# Patient Record
Sex: MALE | Race: BLACK OR AFRICAN AMERICAN | NOT HISPANIC OR LATINO | Employment: FULL TIME | RURAL
[De-identification: names, ages, dates, MRNs, and addresses within clinical notes are randomized per-mention and may not be internally consistent; named-entity substitution may affect disease eponyms.]

---

## 2021-05-02 VITALS — HEIGHT: 69 IN | BODY MASS INDEX: 46.65 KG/M2 | WEIGHT: 315 LBS

## 2021-05-06 ENCOUNTER — HOSPITAL ENCOUNTER (EMERGENCY)
Facility: HOSPITAL | Age: 31
Discharge: HOME | End: 2021-05-06
Attending: FAMILY MEDICINE
Payer: COMMERCIAL

## 2021-05-06 VITALS
OXYGEN SATURATION: 99 % | RESPIRATION RATE: 18 BRPM | WEIGHT: 300 LBS | DIASTOLIC BLOOD PRESSURE: 89 MMHG | TEMPERATURE: 99 F | HEIGHT: 68 IN | BODY MASS INDEX: 45.47 KG/M2 | HEART RATE: 96 BPM | SYSTOLIC BLOOD PRESSURE: 127 MMHG

## 2021-05-06 DIAGNOSIS — L73.2 HIDRADENITIS SUPPURATIVA: Primary | ICD-10-CM

## 2021-05-06 PROCEDURE — 99283 PR EMERGENCY DEPT VISIT,LEVEL III: ICD-10-PCS | Mod: ,,, | Performed by: FAMILY MEDICINE

## 2021-05-06 PROCEDURE — 99283 EMERGENCY DEPT VISIT LOW MDM: CPT

## 2021-05-06 PROCEDURE — 99283 EMERGENCY DEPT VISIT LOW MDM: CPT | Mod: ,,, | Performed by: FAMILY MEDICINE

## 2021-05-06 RX ORDER — CLINDAMYCIN HYDROCHLORIDE 300 MG/1
300 CAPSULE ORAL EVERY 6 HOURS
Qty: 3 CAPSULE | Refills: 0 | Status: SHIPPED | OUTPATIENT
Start: 2021-05-06 | End: 2021-09-23

## 2021-05-19 ENCOUNTER — OFFICE VISIT (OUTPATIENT)
Dept: INTERNAL MEDICINE | Facility: CLINIC | Age: 31
End: 2021-05-19
Payer: COMMERCIAL

## 2021-05-19 VITALS
OXYGEN SATURATION: 100 % | RESPIRATION RATE: 16 BRPM | DIASTOLIC BLOOD PRESSURE: 100 MMHG | SYSTOLIC BLOOD PRESSURE: 160 MMHG | WEIGHT: 315 LBS | HEART RATE: 88 BPM | BODY MASS INDEX: 47.74 KG/M2 | HEIGHT: 68 IN

## 2021-05-19 DIAGNOSIS — I10 ELEVATED BLOOD PRESSURE READING IN OFFICE WITH DIAGNOSIS OF HYPERTENSION: ICD-10-CM

## 2021-05-19 DIAGNOSIS — L03.115 CELLULITIS AND ABSCESS OF RIGHT LOWER EXTREMITY: ICD-10-CM

## 2021-05-19 DIAGNOSIS — L02.415 CELLULITIS AND ABSCESS OF RIGHT LOWER EXTREMITY: ICD-10-CM

## 2021-05-19 DIAGNOSIS — L73.2 HIDRADENITIS SUPPURATIVA: ICD-10-CM

## 2021-05-19 DIAGNOSIS — F17.200 TOBACCO DEPENDENCE: ICD-10-CM

## 2021-05-19 DIAGNOSIS — Z76.89 ENCOUNTER TO ESTABLISH CARE WITH NEW DOCTOR: Primary | ICD-10-CM

## 2021-05-19 PROCEDURE — 99214 PR OFFICE/OUTPT VISIT, EST, LEVL IV, 30-39 MIN: ICD-10-PCS | Mod: S$PBB,,, | Performed by: INTERNAL MEDICINE

## 2021-05-19 PROCEDURE — 99214 OFFICE O/P EST MOD 30 MIN: CPT | Mod: PBBFAC | Performed by: INTERNAL MEDICINE

## 2021-05-19 PROCEDURE — 99214 OFFICE O/P EST MOD 30 MIN: CPT | Mod: S$PBB,,, | Performed by: INTERNAL MEDICINE

## 2021-05-19 RX ORDER — CLINDAMYCIN HYDROCHLORIDE 300 MG/1
300 CAPSULE ORAL EVERY 6 HOURS
Qty: 56 CAPSULE | Refills: 0 | Status: SHIPPED | OUTPATIENT
Start: 2021-05-19 | End: 2021-06-02

## 2021-05-19 RX ORDER — HYDROCODONE BITARTRATE AND ACETAMINOPHEN 7.5; 325 MG/1; MG/1
1 TABLET ORAL EVERY 6 HOURS PRN
Qty: 20 TABLET | Refills: 0 | Status: SHIPPED | OUTPATIENT
Start: 2021-05-19 | End: 2023-01-25

## 2021-05-24 ENCOUNTER — HOSPITAL ENCOUNTER (OUTPATIENT)
Dept: RADIOLOGY | Facility: HOSPITAL | Age: 31
Discharge: HOME | End: 2021-05-24
Attending: INTERNAL MEDICINE
Payer: COMMERCIAL

## 2021-05-24 DIAGNOSIS — L02.415 CELLULITIS AND ABSCESS OF RIGHT LOWER EXTREMITY: ICD-10-CM

## 2021-05-24 DIAGNOSIS — L03.115 CELLULITIS AND ABSCESS OF RIGHT LOWER EXTREMITY: ICD-10-CM

## 2021-05-24 PROCEDURE — 93926 US LOWER EXTREMITY ARTERIES RIGHT: ICD-10-PCS | Mod: 26,RT,,

## 2021-05-24 PROCEDURE — 93926 LOWER EXTREMITY STUDY: CPT | Mod: 26,RT,,

## 2021-05-24 PROCEDURE — 93926 LOWER EXTREMITY STUDY: CPT | Mod: TC,RT

## 2021-06-07 ENCOUNTER — OFFICE VISIT (OUTPATIENT)
Dept: SURGERY | Facility: CLINIC | Age: 31
End: 2021-06-07
Payer: COMMERCIAL

## 2021-06-07 ENCOUNTER — OFFICE VISIT (OUTPATIENT)
Dept: FAMILY MEDICINE | Facility: CLINIC | Age: 31
End: 2021-06-07
Payer: COMMERCIAL

## 2021-06-07 VITALS
DIASTOLIC BLOOD PRESSURE: 92 MMHG | OXYGEN SATURATION: 100 % | HEART RATE: 72 BPM | WEIGHT: 315 LBS | BODY MASS INDEX: 47.74 KG/M2 | SYSTOLIC BLOOD PRESSURE: 150 MMHG | TEMPERATURE: 99 F | RESPIRATION RATE: 18 BRPM | HEIGHT: 68 IN

## 2021-06-07 VITALS — WEIGHT: 315 LBS | HEIGHT: 68 IN | BODY MASS INDEX: 47.74 KG/M2

## 2021-06-07 DIAGNOSIS — Z11.3 SCREENING EXAMINATION FOR VENEREAL DISEASE: Primary | ICD-10-CM

## 2021-06-07 DIAGNOSIS — L73.2 HIDRADENITIS SUPPURATIVA: ICD-10-CM

## 2021-06-07 LAB
HIV 1+O+2 AB SERPL QL: NORMAL
SYPHILIS AB INTERPRETATION: NORMAL

## 2021-06-07 PROCEDURE — 99202 OFFICE O/P NEW SF 15 MIN: CPT | Mod: ,,, | Performed by: NURSE PRACTITIONER

## 2021-06-07 PROCEDURE — 86703 HIV-1/HIV-2 1 RESULT ANTBDY: CPT | Mod: ,,, | Performed by: CLINICAL MEDICAL LABORATORY

## 2021-06-07 PROCEDURE — 87591 CHLAMYDIA/GONORRHOEAE(GC), PCR: ICD-10-PCS | Mod: ,,, | Performed by: CLINICAL MEDICAL LABORATORY

## 2021-06-07 PROCEDURE — 99204 PR OFFICE/OUTPT VISIT, NEW, LEVL IV, 45-59 MIN: ICD-10-PCS | Mod: S$PBB,,, | Performed by: STUDENT IN AN ORGANIZED HEALTH CARE EDUCATION/TRAINING PROGRAM

## 2021-06-07 PROCEDURE — 86694 HERPES SIMPLEX NES ANTBDY: CPT | Mod: ,,, | Performed by: CLINICAL MEDICAL LABORATORY

## 2021-06-07 PROCEDURE — 87591 N.GONORRHOEAE DNA AMP PROB: CPT | Mod: ,,, | Performed by: CLINICAL MEDICAL LABORATORY

## 2021-06-07 PROCEDURE — 86695 HERPES SIMPLEX 1 & 2 IGG: ICD-10-PCS | Mod: ,,, | Performed by: CLINICAL MEDICAL LABORATORY

## 2021-06-07 PROCEDURE — 99213 OFFICE O/P EST LOW 20 MIN: CPT | Mod: PBBFAC | Performed by: STUDENT IN AN ORGANIZED HEALTH CARE EDUCATION/TRAINING PROGRAM

## 2021-06-07 PROCEDURE — 87491 CHLMYD TRACH DNA AMP PROBE: CPT | Mod: ,,, | Performed by: CLINICAL MEDICAL LABORATORY

## 2021-06-07 PROCEDURE — 99204 OFFICE O/P NEW MOD 45 MIN: CPT | Mod: S$PBB,,, | Performed by: STUDENT IN AN ORGANIZED HEALTH CARE EDUCATION/TRAINING PROGRAM

## 2021-06-07 PROCEDURE — 99202 PR OFFICE/OUTPT VISIT, NEW, LEVL II, 15-29 MIN: ICD-10-PCS | Mod: ,,, | Performed by: NURSE PRACTITIONER

## 2021-06-07 PROCEDURE — 86696 HERPES SIMPLEX 1 & 2 IGG: ICD-10-PCS | Mod: ,,, | Performed by: CLINICAL MEDICAL LABORATORY

## 2021-06-07 PROCEDURE — 87491 CHLAMYDIA/GONORRHOEAE(GC), PCR: ICD-10-PCS | Mod: ,,, | Performed by: CLINICAL MEDICAL LABORATORY

## 2021-06-07 PROCEDURE — 86780 TREPONEMA PALLIDUM: CPT | Mod: ,,, | Performed by: CLINICAL MEDICAL LABORATORY

## 2021-06-07 PROCEDURE — 86694 HERPES SIMPLEX 1 & 2 IGM: ICD-10-PCS | Mod: ,,, | Performed by: CLINICAL MEDICAL LABORATORY

## 2021-06-07 PROCEDURE — 86780 TREPONEMA PALLIDUM (SYPHILIS) ANTIBODY: ICD-10-PCS | Mod: ,,, | Performed by: CLINICAL MEDICAL LABORATORY

## 2021-06-07 PROCEDURE — 86696 HERPES SIMPLEX TYPE 2 TEST: CPT | Mod: ,,, | Performed by: CLINICAL MEDICAL LABORATORY

## 2021-06-07 PROCEDURE — 86695 HERPES SIMPLEX TYPE 1 TEST: CPT | Mod: ,,, | Performed by: CLINICAL MEDICAL LABORATORY

## 2021-06-07 PROCEDURE — 86703 HIV 1 / 2 ANTIBODY: ICD-10-PCS | Mod: ,,, | Performed by: CLINICAL MEDICAL LABORATORY

## 2021-06-08 ENCOUNTER — TELEPHONE (OUTPATIENT)
Dept: FAMILY MEDICINE | Facility: CLINIC | Age: 31
End: 2021-06-08

## 2021-06-08 LAB
CHLAMYDIA BY PCR: NEGATIVE
HSV IGM SER QL IA: NEGATIVE
HSV TYPE 1 AB IGG INDEX: >7.83
HSV TYPE 2 AB IGG INDEX: 0.07
HSV1 IGG SER QL: POSITIVE
HSV2 IGG SER QL: NEGATIVE
N. GONORRHOEAE (GC) BY PCR: NEGATIVE

## 2021-06-09 DIAGNOSIS — L73.2 HIDRADENITIS SUPPURATIVA: Primary | ICD-10-CM

## 2021-09-23 ENCOUNTER — OFFICE VISIT (OUTPATIENT)
Dept: FAMILY MEDICINE | Facility: CLINIC | Age: 31
End: 2021-09-23
Payer: COMMERCIAL

## 2021-09-23 VITALS
RESPIRATION RATE: 20 BRPM | HEIGHT: 69 IN | BODY MASS INDEX: 46.65 KG/M2 | TEMPERATURE: 97 F | OXYGEN SATURATION: 98 % | HEART RATE: 86 BPM | WEIGHT: 315 LBS

## 2021-09-23 DIAGNOSIS — Z20.822 EXPOSURE TO COVID-19 VIRUS: Primary | ICD-10-CM

## 2021-09-23 DIAGNOSIS — J01.00 ACUTE NON-RECURRENT MAXILLARY SINUSITIS: ICD-10-CM

## 2021-09-23 DIAGNOSIS — Z20.822 LAB TEST NEGATIVE FOR COVID-19 VIRUS: ICD-10-CM

## 2021-09-23 LAB
CTP QC/QA: YES
SARS-COV-2 AG RESP QL IA.RAPID: NEGATIVE

## 2021-09-23 PROCEDURE — 99213 OFFICE O/P EST LOW 20 MIN: CPT | Mod: ,,, | Performed by: NURSE PRACTITIONER

## 2021-09-23 PROCEDURE — 99213 PR OFFICE/OUTPT VISIT, EST, LEVL III, 20-29 MIN: ICD-10-PCS | Mod: ,,, | Performed by: NURSE PRACTITIONER

## 2021-09-23 PROCEDURE — 87426 SARSCOV CORONAVIRUS AG IA: CPT | Mod: QW,,, | Performed by: NURSE PRACTITIONER

## 2021-09-23 PROCEDURE — 87426 SARS CORONAVIRUS 2 ANTIGEN POCT: ICD-10-PCS | Mod: QW,,, | Performed by: NURSE PRACTITIONER

## 2021-09-23 RX ORDER — AMOXICILLIN AND CLAVULANATE POTASSIUM 875; 125 MG/1; MG/1
1 TABLET, FILM COATED ORAL 2 TIMES DAILY
Qty: 20 TABLET | Refills: 0 | Status: SHIPPED | OUTPATIENT
Start: 2021-09-23 | End: 2021-10-03

## 2021-12-27 PROBLEM — J01.00 ACUTE NON-RECURRENT MAXILLARY SINUSITIS: Status: RESOLVED | Noted: 2021-09-23 | Resolved: 2021-12-27

## 2022-02-03 ENCOUNTER — OFFICE VISIT (OUTPATIENT)
Dept: FAMILY MEDICINE | Facility: CLINIC | Age: 32
End: 2022-02-03

## 2022-02-03 VITALS
OXYGEN SATURATION: 98 % | RESPIRATION RATE: 20 BRPM | BODY MASS INDEX: 46.98 KG/M2 | HEART RATE: 94 BPM | SYSTOLIC BLOOD PRESSURE: 143 MMHG | DIASTOLIC BLOOD PRESSURE: 93 MMHG | TEMPERATURE: 98 F | HEIGHT: 68 IN | WEIGHT: 310 LBS

## 2022-02-03 DIAGNOSIS — Z11.3 SCREENING EXAMINATION FOR VENEREAL DISEASE: ICD-10-CM

## 2022-02-03 DIAGNOSIS — J01.00 ACUTE NON-RECURRENT MAXILLARY SINUSITIS: ICD-10-CM

## 2022-02-03 DIAGNOSIS — Z20.828 CONTACT WITH OR EXPOSURE TO VIRAL DISEASE: Primary | ICD-10-CM

## 2022-02-03 LAB
CTP QC/QA: YES
SARS-COV-2 AG RESP QL IA.RAPID: NEGATIVE

## 2022-02-03 PROCEDURE — 87491 CHLAMYDIA/GONORRHOEAE(GC), PCR: ICD-10-PCS | Mod: ,,, | Performed by: CLINICAL MEDICAL LABORATORY

## 2022-02-03 PROCEDURE — 87389 HIV-1 AG W/HIV-1&-2 AB AG IA: CPT | Mod: ,,, | Performed by: CLINICAL MEDICAL LABORATORY

## 2022-02-03 PROCEDURE — 87591 CHLAMYDIA/GONORRHOEAE(GC), PCR: ICD-10-PCS | Mod: ,,, | Performed by: CLINICAL MEDICAL LABORATORY

## 2022-02-03 PROCEDURE — 87491 CHLMYD TRACH DNA AMP PROBE: CPT | Mod: ,,, | Performed by: CLINICAL MEDICAL LABORATORY

## 2022-02-03 PROCEDURE — 87591 N.GONORRHOEAE DNA AMP PROB: CPT | Mod: ,,, | Performed by: CLINICAL MEDICAL LABORATORY

## 2022-02-03 PROCEDURE — 86780 TREPONEMA PALLIDUM (SYPHILIS) ANTIBODY: ICD-10-PCS | Mod: ,,, | Performed by: CLINICAL MEDICAL LABORATORY

## 2022-02-03 PROCEDURE — 86695 HERPES SIMPLEX TYPE 1 TEST: CPT | Mod: ,,, | Performed by: CLINICAL MEDICAL LABORATORY

## 2022-02-03 PROCEDURE — 86694 HERPES SIMPLEX 1 & 2 IGM: ICD-10-PCS | Mod: ,,, | Performed by: CLINICAL MEDICAL LABORATORY

## 2022-02-03 PROCEDURE — 86780 TREPONEMA PALLIDUM: CPT | Mod: ,,, | Performed by: CLINICAL MEDICAL LABORATORY

## 2022-02-03 PROCEDURE — 86696 HERPES SIMPLEX 1 & 2 IGG: ICD-10-PCS | Mod: ,,, | Performed by: CLINICAL MEDICAL LABORATORY

## 2022-02-03 PROCEDURE — 86696 HERPES SIMPLEX TYPE 2 TEST: CPT | Mod: ,,, | Performed by: CLINICAL MEDICAL LABORATORY

## 2022-02-03 PROCEDURE — 87426 SARS CORONAVIRUS 2 ANTIGEN POCT: ICD-10-PCS | Mod: QW,,, | Performed by: NURSE PRACTITIONER

## 2022-02-03 PROCEDURE — 87389 HIV 1 / 2 ANTIBODY: ICD-10-PCS | Mod: ,,, | Performed by: CLINICAL MEDICAL LABORATORY

## 2022-02-03 PROCEDURE — 86694 HERPES SIMPLEX NES ANTBDY: CPT | Mod: ,,, | Performed by: CLINICAL MEDICAL LABORATORY

## 2022-02-03 PROCEDURE — 99213 PR OFFICE/OUTPT VISIT, EST, LEVL III, 20-29 MIN: ICD-10-PCS | Mod: ,,, | Performed by: NURSE PRACTITIONER

## 2022-02-03 PROCEDURE — 86695 HERPES SIMPLEX 1 & 2 IGG: ICD-10-PCS | Mod: ,,, | Performed by: CLINICAL MEDICAL LABORATORY

## 2022-02-03 PROCEDURE — 87426 SARSCOV CORONAVIRUS AG IA: CPT | Mod: QW,,, | Performed by: NURSE PRACTITIONER

## 2022-02-03 PROCEDURE — 99213 OFFICE O/P EST LOW 20 MIN: CPT | Mod: ,,, | Performed by: NURSE PRACTITIONER

## 2022-02-03 RX ORDER — CEFDINIR 300 MG/1
300 CAPSULE ORAL 2 TIMES DAILY
Qty: 20 CAPSULE | Refills: 0 | Status: SHIPPED | OUTPATIENT
Start: 2022-02-03 | End: 2022-02-13

## 2022-02-03 RX ORDER — LORATADINE 10 MG/1
10 TABLET ORAL DAILY
Qty: 30 TABLET | Refills: 0 | Status: SHIPPED | OUTPATIENT
Start: 2022-02-03 | End: 2023-05-15

## 2022-02-03 NOTE — PROGRESS NOTES
Subjective:       Patient ID: Clayton Capone is a 31 y.o. male.    Chief Complaint: Headache and Nasal Congestion (bodyaches)    Headache, nasal congestion and body aches  Covid concerns  Wants STD testing- no symptoms    Review of Systems   Constitutional: Negative for appetite change, chills, fatigue and fever.   HENT: Positive for nasal congestion. Negative for ear pain and sore throat.    Eyes: Negative for pain, discharge and itching.   Respiratory: Negative for cough and shortness of breath.    Cardiovascular: Negative for chest pain and leg swelling.   Gastrointestinal: Negative for abdominal pain, change in bowel habit, nausea, vomiting and change in bowel habit.   Genitourinary: Negative for dysuria, flank pain, frequency, genital sores and urgency.   Musculoskeletal: Negative for back pain, gait problem and neck pain.        Body aches   Integumentary:  Negative for rash and wound.   Allergic/Immunologic: Negative for immunocompromised state.   Neurological: Positive for headaches. Negative for dizziness and weakness.   All other systems reviewed and are negative.        Objective:      Physical Exam  Vitals and nursing note reviewed.   Constitutional:       General: He is not in acute distress.     Appearance: Normal appearance. He is not ill-appearing, toxic-appearing or diaphoretic.   HENT:      Head: Normocephalic.      Right Ear: Tympanic membrane, ear canal and external ear normal.      Left Ear: Tympanic membrane, ear canal and external ear normal.      Nose: Congestion and rhinorrhea present.      Mouth/Throat:      Mouth: Mucous membranes are moist.      Pharynx: Posterior oropharyngeal erythema present. No oropharyngeal exudate.   Eyes:      General: No scleral icterus.        Right eye: No discharge.         Left eye: No discharge.      Extraocular Movements: Extraocular movements intact.      Conjunctiva/sclera: Conjunctivae normal.      Pupils: Pupils are equal, round, and reactive to light.    Cardiovascular:      Rate and Rhythm: Normal rate and regular rhythm.      Pulses: Normal pulses.      Heart sounds: Normal heart sounds. No murmur heard.      Pulmonary:      Effort: Pulmonary effort is normal. No respiratory distress.      Breath sounds: Normal breath sounds. No wheezing, rhonchi or rales.   Musculoskeletal:         General: Normal range of motion.      Cervical back: Neck supple. No tenderness.   Lymphadenopathy:      Cervical: No cervical adenopathy.   Skin:     General: Skin is warm and dry.      Capillary Refill: Capillary refill takes less than 2 seconds.      Findings: No rash.   Neurological:      Mental Status: He is alert and oriented to person, place, and time.   Psychiatric:         Mood and Affect: Mood normal.         Behavior: Behavior normal.         Thought Content: Thought content normal.         Judgment: Judgment normal.            Assessment:       1. Contact with or exposure to viral disease    2. Acute non-recurrent maxillary sinusitis    3. Screening examination for venereal disease        Plan:   Contact with or exposure to viral disease  -     SARS Coronavirus 2 Antigen, POCT    Acute non-recurrent maxillary sinusitis  -     cefdinir (OMNICEF) 300 MG capsule; Take 1 capsule (300 mg total) by mouth 2 (two) times daily. for 10 days  Dispense: 20 capsule; Refill: 0  -     loratadine (CLARITIN) 10 mg tablet; Take 1 tablet (10 mg total) by mouth once daily. for 30 doses  Dispense: 30 tablet; Refill: 0    Screening examination for venereal disease  -     Chlamydia/GC, PCR; Future; Expected date: 02/03/2022  -     HIV 1/2 Ag/Ab (4th Gen); Future; Expected date: 02/03/2022  -     Herpes simplex type 1&2 IgG,Herpes titer; Future; Expected date: 02/03/2022  -     Herpes simplex type 1 & 2 IgM,Herpes IgM; Future; Expected date: 02/03/2022  -     Syphilis Antibody with reflex to RPR; Future; Expected date: 02/03/2022

## 2022-02-06 LAB
CHLAMYDIA BY PCR: NEGATIVE
N. GONORRHOEAE (GC) BY PCR: NEGATIVE

## 2022-02-07 LAB
HIV 1+O+2 AB SERPL QL: NORMAL
SYPHILIS AB INTERPRETATION: NORMAL

## 2022-02-08 LAB
HSV TYPE 1 AB IGG INDEX: >11.54
HSV TYPE 2 AB IGG INDEX: 0.5
HSV1 IGG SER QL: POSITIVE
HSV2 IGG SER QL: NEGATIVE

## 2022-02-09 LAB — HSV IGM SER QL IA: NEGATIVE

## 2022-02-10 ENCOUNTER — TELEPHONE (OUTPATIENT)
Dept: FAMILY MEDICINE | Facility: CLINIC | Age: 32
End: 2022-02-10

## 2022-02-10 NOTE — TELEPHONE ENCOUNTER
Identify patient by name and . Informed patient labs was ready for reviewed. Saw labs on [facility]. ----- Message from MILAN Singer sent at 2022  7:32 AM CST -----  Please notify Pt of results

## 2022-05-09 PROBLEM — J01.00 ACUTE NON-RECURRENT MAXILLARY SINUSITIS: Status: RESOLVED | Noted: 2021-09-23 | Resolved: 2022-05-09

## 2022-08-10 ENCOUNTER — HOSPITAL ENCOUNTER (EMERGENCY)
Facility: HOSPITAL | Age: 32
Discharge: HOME | End: 2022-08-10
Attending: EMERGENCY MEDICINE

## 2022-08-10 VITALS
HEART RATE: 56 BPM | BODY MASS INDEX: 47.74 KG/M2 | SYSTOLIC BLOOD PRESSURE: 131 MMHG | OXYGEN SATURATION: 98 % | WEIGHT: 315 LBS | HEIGHT: 68 IN | TEMPERATURE: 99 F | DIASTOLIC BLOOD PRESSURE: 87 MMHG | RESPIRATION RATE: 18 BRPM

## 2022-08-10 DIAGNOSIS — L02.91 ABSCESS: Primary | ICD-10-CM

## 2022-08-10 PROCEDURE — 99282 EMERGENCY DEPT VISIT SF MDM: CPT | Mod: 25,,, | Performed by: EMERGENCY MEDICINE

## 2022-08-10 PROCEDURE — 10060 I&D ABSCESS SIMPLE/SINGLE: CPT

## 2022-08-10 PROCEDURE — 10060 PR DRAIN SKIN ABSCESS SIMPLE: ICD-10-PCS | Mod: ,,, | Performed by: EMERGENCY MEDICINE

## 2022-08-10 PROCEDURE — 10060 I&D ABSCESS SIMPLE/SINGLE: CPT | Mod: ,,, | Performed by: EMERGENCY MEDICINE

## 2022-08-10 PROCEDURE — 99282 PR EMERGENCY DEPT VISIT,LEVEL II: ICD-10-PCS | Mod: 25,,, | Performed by: EMERGENCY MEDICINE

## 2022-08-10 PROCEDURE — 25000003 PHARM REV CODE 250: Performed by: EMERGENCY MEDICINE

## 2022-08-10 PROCEDURE — 99282 EMERGENCY DEPT VISIT SF MDM: CPT | Mod: 25

## 2022-08-10 RX ORDER — LIDOCAINE HYDROCHLORIDE 10 MG/ML
20 INJECTION, SOLUTION EPIDURAL; INFILTRATION; INTRACAUDAL; PERINEURAL
Status: COMPLETED | OUTPATIENT
Start: 2022-08-10 | End: 2022-08-10

## 2022-08-10 RX ORDER — CLINDAMYCIN HYDROCHLORIDE 300 MG/1
300 CAPSULE ORAL
COMMUNITY
Start: 2022-07-26 | End: 2022-10-24

## 2022-08-10 RX ADMIN — LIDOCAINE HYDROCHLORIDE 200 MG: 10 INJECTION, SOLUTION EPIDURAL; INFILTRATION; INTRACAUDAL; PERINEURAL at 08:08

## 2022-08-10 NOTE — Clinical Note
"Clayton "Terrellpaula Capone was seen and treated in our emergency department on 8/10/2022.  He may return to work on 08/13/2022.       If you have any questions or concerns, please don't hesitate to call.      KAIN Parra RN    "

## 2022-08-10 NOTE — ED PROVIDER NOTES
Encounter Date: 8/10/2022       History     Chief Complaint   Patient presents with    Abscess     32 y/o male with a history of hidradenitis superativa presents with right axillary swelling and tenderness that has worsened over the past several days.  Pain is moderate to severe.  Pain is worse with palpation and movement.          Review of patient's allergies indicates:  No Known Allergies  Past Medical History:   Diagnosis Date    Cellulitis     Cyst on buttocks    Edema     Herpes simplex viral infection     Hidradenitis suppurativa     High risk sexual behavior     Hypertension     Parageusia      Past Surgical History:   Procedure Laterality Date    TONSILLECTOMY       Family History   Problem Relation Age of Onset    Cancer Other     Diabetes Other     Hypertension Other      Social History     Tobacco Use    Smoking status: Current Every Day Smoker     Packs/day: 0.50     Years: 5.00     Pack years: 2.50     Types: Cigarettes, Vaping w/o nicotine    Smokeless tobacco: Never Used   Substance Use Topics    Alcohol use: Yes     Comment: occ spirits    Drug use: Yes     Types: Marijuana     Review of Systems   All other systems reviewed and are negative.      Physical Exam     Initial Vitals [08/10/22 0747]   BP Pulse Resp Temp SpO2   131/87 (!) 56 18 98.7 °F (37.1 °C) 98 %      MAP       --         Physical Exam    Nursing note and vitals reviewed.  Constitutional: He appears well-developed and well-nourished.   HENT:   Head: Normocephalic and atraumatic.   Nose: Nose normal.   Mouth/Throat: Oropharynx is clear and moist.   Eyes: Conjunctivae and EOM are normal. Pupils are equal, round, and reactive to light.   Neck: Neck supple.   Normal range of motion.  Cardiovascular: Normal rate, regular rhythm and normal heart sounds.   Pulmonary/Chest: Breath sounds normal.   Abdominal: Abdomen is soft. Bowel sounds are normal.   Musculoskeletal:      Cervical back: Normal range of motion and neck supple.  "     Comments: Several areas of superficial drainage right axilla and a discrete larger fluctuant lesion that is about 1 x 2 cm.  Tender to palpation.       Neurological: He is alert and oriented to person, place, and time. He has normal strength. GCS score is 15. GCS eye subscore is 4. GCS verbal subscore is 5. GCS motor subscore is 6.   Skin: Skin is warm and dry. Capillary refill takes less than 2 seconds.   Psychiatric: He has a normal mood and affect.         Medical Screening Exam   See Full Note    ED Course   I & D - Incision and Drainage    Date/Time: 8/10/2022 8:48 AM  Location procedure was performed: [facility]  Performed by: Frank Bautista MD  Authorized by: Frank Bautista MD   Assisting provider: Frank Bautista MD  Pre-operative diagnosis: abscess  Post-operative diagnosis: abscess  Consent Done: Yes  Consent: Verbal consent obtained. Written consent obtained.  Risks and benefits: risks, benefits and alternatives were discussed  Consent given by: patient  Patient understanding: patient states understanding of the procedure being performed  Patient identity confirmed:  and name  Time out: Immediately prior to procedure a "time out" was called to verify the correct patient, procedure, equipment, support staff and site/side marked as required.  Type: abscess  Body area: trunk  Anesthesia: local infiltration    Anesthesia:  Local Anesthetic: lidocaine 1% without epinephrine  Anesthetic total: 10 mL    Patient sedated: no  Description of findings: purulent drainage   Scalpel size: 15  Incision type: single straight  Complexity: simple  Drainage: pus  Drainage amount: moderate  Wound treatment: incision,  expression of material and  drain placed  Packing material: 1/4 in gauze  Complications: No  Estimated blood loss (mL): 0  Specimens: No  Implants: No  Patient tolerance: Patient tolerated the procedure well with no immediate complications        Labs Reviewed - No data to " display       Imaging Results    None          Medications   LIDOcaine (PF) 10 mg/ml (1%) injection 200 mg (200 mg Intradermal Given 8/10/22 0859)                       Clinical Impression:   Final diagnoses:  [L02.91] Abscess (Primary)          ED Disposition Condition    Discharge Stable        ED Prescriptions     None        Follow-up Information     Follow up With Specialties Details Why Contact Info    Mike Dodson, DO Critical Care Medicine  As needed [address]  [place] [address]  [phone]             Frank Bautista MD  08/10/22 0973

## 2022-10-26 ENCOUNTER — HOSPITAL ENCOUNTER (EMERGENCY)
Facility: HOSPITAL | Age: 32
Discharge: HOME | End: 2022-10-26
Attending: EMERGENCY MEDICINE

## 2022-10-26 VITALS
DIASTOLIC BLOOD PRESSURE: 85 MMHG | SYSTOLIC BLOOD PRESSURE: 151 MMHG | TEMPERATURE: 98 F | RESPIRATION RATE: 18 BRPM | BODY MASS INDEX: 47.74 KG/M2 | OXYGEN SATURATION: 97 % | WEIGHT: 315 LBS | HEIGHT: 68 IN | HEART RATE: 71 BPM

## 2022-10-26 DIAGNOSIS — L73.2 SUPPURATIVE HIDRADENITIS: Primary | ICD-10-CM

## 2022-10-26 PROCEDURE — 99281 EMR DPT VST MAYX REQ PHY/QHP: CPT

## 2022-10-26 PROCEDURE — 99283 EMERGENCY DEPT VISIT LOW MDM: CPT | Mod: ,,, | Performed by: EMERGENCY MEDICINE

## 2022-10-26 PROCEDURE — 99283 PR EMERGENCY DEPT VISIT,LEVEL III: ICD-10-PCS | Mod: ,,, | Performed by: EMERGENCY MEDICINE

## 2022-10-26 RX ORDER — RIFAMPIN 300 MG/1
300 CAPSULE ORAL 2 TIMES DAILY
COMMUNITY
Start: 2022-08-04

## 2022-10-26 NOTE — Clinical Note
"Clayton "Frankie Capone was seen and treated in our emergency department on 10/26/2022.  He may return to work on 10/31/2022.       If you have any questions or concerns, please don't hesitate to call.      Frank Bautista MD"

## 2022-10-27 NOTE — DISCHARGE INSTRUCTIONS
CONTINUE YOUR CURRENT ANTIBIOTICS.  DRINK PLENTY OF FLUIDS.  APPLY WARM COMPRESSES OR SOAK IN WARM WATER OR SOAK UNDER WARM SHOWER SEVERAL TIMES DAILY.  FOLLOW UP AS PLANNED.  RETURN IF SYMPTOMS PERSIST OR WORSEN OR OTHERWISE AS NEEDED.

## 2022-10-27 NOTE — ED PROVIDER NOTES
Encounter Date: 10/26/2022    SCRIBE #1 NOTE: I, Alecia Nam, am scribing for, and in the presence of,  Frank Bautista MD. I have scribed the entire note.     History     Chief Complaint   Patient presents with    Abscess     The patient is a 31 y.o. male that presents to the emergency department due to Hidradenitis suppurativa on both his axillae. The patient explains that he was diagnosed with Hidradenitis suppurativa and he is scheduled to see a surgeon in [place] in early December. The patient states today he has been experiencing drainage in both arms and that it is hard to hold both arms up. No other medical hx or symptoms were reported.     The history is provided by the patient. No  was used.   Review of patient's allergies indicates:  No Known Allergies  Past Medical History:   Diagnosis Date    Cellulitis     Cyst on buttocks    Edema     Herpes simplex viral infection     Hidradenitis suppurativa     High risk sexual behavior     Hypertension     Parageusia     Suppurative hidradenitis      Past Surgical History:   Procedure Laterality Date    TONSILLECTOMY       Family History   Problem Relation Age of Onset    Cancer Other     Diabetes Other     Hypertension Other      Social History     Tobacco Use    Smoking status: Former     Packs/day: 0.50     Years: 5.00     Pack years: 2.50     Types: Cigarettes, Vaping w/o nicotine    Smokeless tobacco: Never   Substance Use Topics    Alcohol use: Not Currently     Comment: occ spirits    Drug use: Yes     Types: Marijuana     Comment: 3/4xs daily     Review of Systems   Constitutional: Negative.    HENT: Negative.     Eyes: Negative.    Respiratory: Negative.     Cardiovascular: Negative.    Gastrointestinal: Negative.    Endocrine: Negative.    Genitourinary: Negative.    Musculoskeletal:         Hidradenitis suppurativa    Skin:  Positive for rash and wound.   Allergic/Immunologic: Negative.    Neurological: Negative.    Hematological:  Negative.    Psychiatric/Behavioral: Negative.     All other systems reviewed and are negative.    Physical Exam     Initial Vitals [10/26/22 2119]   BP Pulse Resp Temp SpO2   (!) 151/85 71 18 98.2 °F (36.8 °C) 97 %      MAP       --         Physical Exam    Constitutional: He appears well-developed and well-nourished.   HENT:   Head: Normocephalic and atraumatic.   Right Ear: External ear normal.   Left Ear: External ear normal.   Nose: Nose normal.   Mouth/Throat: Oropharynx is clear and moist.   Eyes: Conjunctivae and EOM are normal. Pupils are equal, round, and reactive to light.   Neck: Neck supple.   Normal range of motion.  Pulmonary/Chest: Breath sounds normal.   Abdominal: Abdomen is soft. Bowel sounds are normal.   Musculoskeletal:         General: Normal range of motion.      Cervical back: Normal range of motion and neck supple.      Comments: Hidradenitis suppurativa  on both armpits      Neurological: He is alert and oriented to person, place, and time. He has normal strength and normal reflexes.   Skin: Skin is warm and dry.   Psychiatric: He has a normal mood and affect. His behavior is normal. Judgment and thought content normal.       ED Course   Procedures  Labs Reviewed - No data to display       Imaging Results    None          Medications - No data to display             Attending Attestation:           Physician Attestation for Scribe:  Physician Attestation Statement for Scribe #1: I, Frank Bautista MD, reviewed documentation, as scribed by Alecia Nam in my presence, and it is both accurate and complete.                        Clinical Impression:   Final diagnoses:  [L73.2] Suppurative hidradenitis (Primary)      ED Disposition Condition    Discharge Stable          ED Prescriptions    None       Follow-up Information       Follow up With Specialties Details Why Contact Info    Mike Dodson, DO Critical Care Medicine  As needed [address]  [place] [address]  [phone]                Frank Bautista MD  10/26/22 7476

## 2022-10-27 NOTE — ED TRIAGE NOTES
Pt has history of HS (Hidradenitis Suppurative)  and has abcesses all of body says the 2 under left armpit bothering him the most. He also has 2 under right armpit and left inner thigh.

## 2022-12-12 ENCOUNTER — HOSPITAL ENCOUNTER (EMERGENCY)
Facility: HOSPITAL | Age: 32
Discharge: HOME | End: 2022-12-12

## 2022-12-12 VITALS
HEART RATE: 83 BPM | RESPIRATION RATE: 18 BRPM | SYSTOLIC BLOOD PRESSURE: 137 MMHG | BODY MASS INDEX: 47.74 KG/M2 | TEMPERATURE: 98 F | WEIGHT: 315 LBS | OXYGEN SATURATION: 98 % | DIASTOLIC BLOOD PRESSURE: 79 MMHG | HEIGHT: 68 IN

## 2022-12-12 DIAGNOSIS — J06.9 UPPER RESPIRATORY TRACT INFECTION, UNSPECIFIED TYPE: Primary | ICD-10-CM

## 2022-12-12 PROCEDURE — 99283 PR EMERGENCY DEPT VISIT,LEVEL III: ICD-10-PCS | Mod: ,,, | Performed by: NURSE PRACTITIONER

## 2022-12-12 PROCEDURE — 99283 EMERGENCY DEPT VISIT LOW MDM: CPT | Mod: ,,, | Performed by: NURSE PRACTITIONER

## 2022-12-12 PROCEDURE — 99284 EMERGENCY DEPT VISIT MOD MDM: CPT

## 2022-12-12 RX ORDER — PROMETHAZINE HYDROCHLORIDE AND DEXTROMETHORPHAN HYDROBROMIDE 6.25; 15 MG/5ML; MG/5ML
5 SYRUP ORAL EVERY 4 HOURS PRN
Qty: 240 ML | Refills: 0 | Status: SHIPPED | OUTPATIENT
Start: 2022-12-12 | End: 2022-12-22

## 2022-12-12 RX ORDER — METHYLPREDNISOLONE 4 MG/1
TABLET ORAL
Qty: 21 EACH | Refills: 0 | Status: SHIPPED | OUTPATIENT
Start: 2022-12-12 | End: 2023-01-02

## 2022-12-12 RX ORDER — AZITHROMYCIN 250 MG/1
TABLET, FILM COATED ORAL
Qty: 6 TABLET | Refills: 0 | Status: SHIPPED | OUTPATIENT
Start: 2022-12-12 | End: 2023-01-25

## 2022-12-12 NOTE — Clinical Note
"Zarinaewillam "Frankie Capone was seen and treated in our emergency department on 12/12/2022.  He may return to work on 12/14/2022.       If you have any questions or concerns, please don't hesitate to call.      Christina Mays, AMYP"

## 2022-12-13 NOTE — ED PROVIDER NOTES
Encounter Date: 12/12/2022       History     Chief Complaint   Patient presents with    Nasal Congestion    Headache    Sore Throat     Pt presents with sinus symptoms x 3 days.    Review of patient's allergies indicates:  No Known Allergies  Past Medical History:   Diagnosis Date    Cellulitis     Cyst on buttocks    Edema     Herpes simplex viral infection     Hidradenitis suppurativa     High risk sexual behavior     Hypertension     Parageusia     Suppurative hidradenitis      Past Surgical History:   Procedure Laterality Date    TONSILLECTOMY       Family History   Problem Relation Age of Onset    Cancer Other     Diabetes Other     Hypertension Other      Social History     Tobacco Use    Smoking status: Former     Packs/day: 0.50     Years: 5.00     Pack years: 2.50     Types: Cigarettes, Vaping w/o nicotine    Smokeless tobacco: Never   Substance Use Topics    Alcohol use: Not Currently     Comment: occ spirits    Drug use: Yes     Types: Marijuana     Comment: 3/4xs daily     Review of Systems   Constitutional:  Negative for fever.   HENT:  Positive for postnasal drip and sinus pressure. Negative for sore throat.    Respiratory:  Positive for cough. Negative for shortness of breath.    Cardiovascular:  Negative for chest pain.   Gastrointestinal:  Negative for nausea.   Genitourinary:  Negative for dysuria.   Musculoskeletal:  Negative for back pain.   Skin:  Negative for rash.   Neurological:  Negative for dizziness and weakness.   Hematological:  Does not bruise/bleed easily.   Psychiatric/Behavioral:  Negative for behavioral problems.      Physical Exam     Initial Vitals [12/12/22 1820]   BP Pulse Resp Temp SpO2   137/79 83 18 97.6 °F (36.4 °C) 98 %      MAP       --         Physical Exam    Nursing note and vitals reviewed.  Constitutional: He appears well-developed.   HENT:   Head: Normocephalic.   Mouth/Throat: No oropharyngeal exudate.   Eyes: Conjunctivae are normal. Pupils are equal, round, and  reactive to light.   Neck:   Normal range of motion.  Cardiovascular:  Normal rate and regular rhythm.           Pulmonary/Chest: Breath sounds normal.   Musculoskeletal:         General: Normal range of motion.      Cervical back: Normal range of motion.     Neurological: He is alert and oriented to person, place, and time.   Psychiatric: He has a normal mood and affect. Thought content normal.       Medical Screening Exam   See Full Note    ED Course   Procedures  Labs Reviewed - No data to display       Imaging Results    None          Medications - No data to display                    Clinical Impression:   Final diagnoses:  [J06.9] Upper respiratory tract infection, unspecified type (Primary)        ED Disposition Condition    Discharge Stable          ED Prescriptions       Medication Sig Dispense Start Date End Date Auth. Provider    azithromycin (Z-SHE) 250 MG tablet 2 tablets on day 1 and 1 tablet on days 2-5 6 tablet 12/12/2022 -- MILAN De La Torre    promethazine-dextromethorphan (PROMETHAZINE-DM) 6.25-15 mg/5 mL Syrp Take 5 mLs by mouth every 4 (four) hours as needed (cough). Do not take while driving 240 mL 12/12/2022 12/22/2022 MILAN De La Torre    methylPREDNISolone (MEDROL DOSEPACK) 4 mg tablet use as directed 21 each 12/12/2022 1/2/2023 MILAN De La Torre          Follow-up Information    None          MILAN De La Torre  12/12/22 5348

## 2023-01-25 ENCOUNTER — OFFICE VISIT (OUTPATIENT)
Dept: FAMILY MEDICINE | Facility: CLINIC | Age: 33
End: 2023-01-25

## 2023-01-25 VITALS
TEMPERATURE: 99 F | BODY MASS INDEX: 47.74 KG/M2 | RESPIRATION RATE: 18 BRPM | HEIGHT: 68 IN | HEART RATE: 68 BPM | OXYGEN SATURATION: 98 % | WEIGHT: 315 LBS

## 2023-01-25 DIAGNOSIS — Z20.828 CONTACT WITH AND (SUSPECTED) EXPOSURE TO OTHER VIRAL COMMUNICABLE DISEASES: Primary | ICD-10-CM

## 2023-01-25 DIAGNOSIS — B34.9 VIRAL ILLNESS: ICD-10-CM

## 2023-01-25 LAB
CTP QC/QA: YES
FLUAV AG NPH QL: NEGATIVE
FLUBV AG NPH QL: NEGATIVE
SARS-COV-2 AG RESP QL IA.RAPID: NEGATIVE

## 2023-01-25 PROCEDURE — 87428 POCT SARS-COV2 (COVID) WITH FLU ANTIGEN: ICD-10-PCS | Mod: QW,,, | Performed by: NURSE PRACTITIONER

## 2023-01-25 PROCEDURE — 99212 PR OFFICE/OUTPT VISIT, EST, LEVL II, 10-19 MIN: ICD-10-PCS | Mod: ,,, | Performed by: NURSE PRACTITIONER

## 2023-01-25 PROCEDURE — 87428 SARSCOV & INF VIR A&B AG IA: CPT | Mod: QW,,, | Performed by: NURSE PRACTITIONER

## 2023-01-25 PROCEDURE — 99212 OFFICE O/P EST SF 10 MIN: CPT | Mod: ,,, | Performed by: NURSE PRACTITIONER

## 2023-01-25 RX ORDER — CHLOPHEDIANOL HCL AND PYRILAMINE MALEATE 12.5; 12.5 MG/5ML; MG/5ML
10 SOLUTION ORAL 3 TIMES DAILY
Qty: 240 ML | Refills: 0 | Status: SHIPPED | OUTPATIENT
Start: 2023-01-25 | End: 2023-01-30

## 2023-01-25 NOTE — PROGRESS NOTES
Subjective:       Patient ID: Clayton Capone is a 32 y.o. male.    Chief Complaint: COVID-19 Concerns (Cough and runny nose. Exposed by family member. Fever 101 at the highest. )    COVID-19 Concerns (Cough and runny nose. Exposed by family member. Fever 101 at the highest. )    Review of Systems   Constitutional:  Positive for fever. Negative for appetite change and fatigue.   HENT:  Positive for nasal congestion and rhinorrhea. Negative for ear pain and sore throat.    Eyes:  Negative for pain, discharge and itching.   Respiratory:  Positive for cough. Negative for shortness of breath.    Cardiovascular:  Negative for chest pain and leg swelling.   Gastrointestinal:  Negative for abdominal pain, change in bowel habit, nausea, vomiting and change in bowel habit.   Musculoskeletal:  Negative for back pain, gait problem and neck pain.   Integumentary:  Negative for rash and wound.   Neurological:  Negative for dizziness, weakness and headaches.   All other systems reviewed and are negative.      Objective:      Physical Exam  Vitals and nursing note reviewed.   Constitutional:       General: He is not in acute distress.     Appearance: Normal appearance. He is not ill-appearing, toxic-appearing or diaphoretic.   HENT:      Head: Normocephalic.      Right Ear: Tympanic membrane, ear canal and external ear normal.      Left Ear: Tympanic membrane, ear canal and external ear normal.      Nose: Mucosal edema, congestion and rhinorrhea (clear) present.      Right Turbinates: Swollen.      Left Turbinates: Swollen.      Mouth/Throat:      Mouth: Mucous membranes are moist.      Pharynx: No oropharyngeal exudate or posterior oropharyngeal erythema.   Eyes:      General: No scleral icterus.        Right eye: No discharge.         Left eye: No discharge.      Extraocular Movements: Extraocular movements intact.      Conjunctiva/sclera: Conjunctivae normal.      Pupils: Pupils are equal, round, and reactive to light.    Cardiovascular:      Rate and Rhythm: Normal rate and regular rhythm.      Pulses: Normal pulses.      Heart sounds: Normal heart sounds. No murmur heard.  Pulmonary:      Effort: Pulmonary effort is normal. No respiratory distress.      Breath sounds: Normal breath sounds. No wheezing, rhonchi or rales.   Musculoskeletal:         General: Normal range of motion.      Cervical back: Neck supple. No tenderness.   Lymphadenopathy:      Cervical: No cervical adenopathy.   Skin:     General: Skin is warm and dry.      Capillary Refill: Capillary refill takes less than 2 seconds.      Findings: No rash.   Neurological:      Mental Status: He is alert and oriented to person, place, and time.   Psychiatric:         Mood and Affect: Mood normal.         Behavior: Behavior normal.         Thought Content: Thought content normal.         Judgment: Judgment normal.       Office Visit on 01/25/2023   Component Date Value Ref Range Status    SARS Coronavirus 2 Antigen 01/25/2023 Negative  Negative Final    Rapid Influenza A Ag 01/25/2023 Negative  Negative Final    Rapid Influenza B Ag 01/25/2023 Negative  Negative Final     Acceptable 01/25/2023 Yes   Final      Assessment:       1. Contact with and (suspected) exposure to other viral communicable diseases    2. Viral illness          Plan:   Contact with and (suspected) exposure to other viral communicable diseases  -     POCT SARS-COV2 (COVID) with Flu Antigen    Viral illness  -     pyrilamine-chlophedianoL (NINJACOF) 12.5-12.5 mg/5 mL Liqd; Take 10 mLs by mouth 3 (three) times daily. for 5 days  Dispense: 240 mL; Refill: 0         Risks, benefits, and side effects were discussed with the patient. All questions were answered to the fullest satisfaction of the patient, and pt verbalized understanding and agreement to treatment plan. Pt was to call with any new or worsening symptoms, or present to the ER

## 2023-01-25 NOTE — LETTER
January 25, 2023      [facility]  [address] [facility] [place] [address]  Phone: [phone]  Fax: [phone]       Patient: Clayton Capone   YOB: 1990  Date of Visit: 01/25/2023    To Whom It May Concern:    Adela Capone  was at [facility] on 01/25/2023. The patient may return to work/school on 01/27/2023 without restrictions. If you have any questions or concerns, or if I can be of further assistance, please do not hesitate to contact me.    Sincerely,    MILAN Singer

## 2023-05-15 ENCOUNTER — HOSPITAL ENCOUNTER (EMERGENCY)
Facility: HOSPITAL | Age: 33
Discharge: HOME | End: 2023-05-15
Attending: EMERGENCY MEDICINE

## 2023-05-15 VITALS
WEIGHT: 315 LBS | HEART RATE: 81 BPM | BODY MASS INDEX: 47.74 KG/M2 | SYSTOLIC BLOOD PRESSURE: 144 MMHG | HEIGHT: 68 IN | TEMPERATURE: 98 F | OXYGEN SATURATION: 97 % | RESPIRATION RATE: 20 BRPM | DIASTOLIC BLOOD PRESSURE: 82 MMHG

## 2023-05-15 DIAGNOSIS — S63.635A SPRAIN OF INTERPHALANGEAL JOINT OF LEFT RING FINGER, INITIAL ENCOUNTER: Primary | ICD-10-CM

## 2023-05-15 PROCEDURE — 96372 THER/PROPH/DIAG INJ SC/IM: CPT | Performed by: EMERGENCY MEDICINE

## 2023-05-15 PROCEDURE — 63600175 PHARM REV CODE 636 W HCPCS: Performed by: EMERGENCY MEDICINE

## 2023-05-15 PROCEDURE — 99283 PR EMERGENCY DEPT VISIT,LEVEL III: ICD-10-PCS | Mod: ,,, | Performed by: EMERGENCY MEDICINE

## 2023-05-15 PROCEDURE — 99283 EMERGENCY DEPT VISIT LOW MDM: CPT | Mod: ,,, | Performed by: EMERGENCY MEDICINE

## 2023-05-15 PROCEDURE — 99284 EMERGENCY DEPT VISIT MOD MDM: CPT

## 2023-05-15 RX ORDER — KETOROLAC TROMETHAMINE 30 MG/ML
60 INJECTION, SOLUTION INTRAMUSCULAR; INTRAVENOUS
Status: COMPLETED | OUTPATIENT
Start: 2023-05-15 | End: 2023-05-15

## 2023-05-15 RX ADMIN — KETOROLAC TROMETHAMINE 60 MG: 30 INJECTION, SOLUTION INTRAMUSCULAR at 08:05

## 2023-05-15 NOTE — ED TRIAGE NOTES
Presents to ED for complaints of Left Ring Finger possibly sprained or jammed for 2 weeks.  Denies injury at present.

## 2023-05-15 NOTE — ED PROVIDER NOTES
Encounter Date: 5/15/2023       History     Chief Complaint   Patient presents with    Hand Pain     Patient complains of pain to the left 4th finger over the past 2 weeks.  Incomplete relief with ibuprofen.  He has worn a finger splint some at home which he obtained on his own accord.  No known injury.  Patient does a twisting often action at work but is unsure whether that caused the pain.  Minimal swelling.  Worse with movement    Review of patient's allergies indicates:  No Known Allergies  Past Medical History:   Diagnosis Date    Cellulitis     Cyst on buttocks    Edema     Herpes simplex viral infection     Hidradenitis suppurativa     High risk sexual behavior     Hypertension     Parageusia     Suppurative hidradenitis      Past Surgical History:   Procedure Laterality Date    TONSILLECTOMY       Family History   Problem Relation Age of Onset    Cancer Other     Diabetes Other     Hypertension Other      Social History     Tobacco Use    Smoking status: Every Day     Packs/day: 0.50     Years: 5.00     Pack years: 2.50     Types: Cigarettes, Vaping w/o nicotine    Smokeless tobacco: Never   Substance Use Topics    Alcohol use: Not Currently     Comment: occ spirits    Drug use: Yes     Types: Marijuana     Comment: 3/4xs daily     Review of Systems   Constitutional:  Negative for fever.   HENT:  Negative for sore throat.    Respiratory:  Negative for shortness of breath.    Cardiovascular:  Negative for chest pain.   Gastrointestinal:  Negative for nausea.   Genitourinary:  Negative for dysuria.   Musculoskeletal:  Negative for back pain.   Skin:  Negative for rash.   Neurological:  Negative for weakness.   Hematological:  Does not bruise/bleed easily.     Physical Exam     Initial Vitals [05/15/23 0819]   BP Pulse Resp Temp SpO2   (!) 144/82 81 20 98.3 °F (36.8 °C) 97 %      MAP       --         Physical Exam    Nursing note and vitals reviewed.  Constitutional: He appears well-developed and  well-nourished.   HENT:   Head: Normocephalic and atraumatic.   Eyes: EOM are normal. Pupils are equal, round, and reactive to light.   Neck: Neck supple. No thyromegaly present. No JVD present.   Normal range of motion.  Cardiovascular:  Normal rate, regular rhythm, normal heart sounds and intact distal pulses.           No murmur heard.  Pulmonary/Chest: Breath sounds normal. No stridor. No respiratory distress. He has no wheezes.   Abdominal: Abdomen is soft. Bowel sounds are normal. He exhibits no distension. There is no abdominal tenderness.   Musculoskeletal:         General: Tenderness present. No edema. Normal range of motion.      Cervical back: Normal range of motion and neck supple.      Comments: Mild tenderness proximal interphalangeal joint left 4th finger.  No flexor tenderness.  Minimal swelling.  Near complete full range of motion.     Lymphadenopathy:     He has no cervical adenopathy.   Neurological: He is alert and oriented to person, place, and time. He has normal strength. No cranial nerve deficit or sensory deficit. GCS score is 15. GCS eye subscore is 4. GCS verbal subscore is 5. GCS motor subscore is 6.   Skin: Skin is warm and dry. Capillary refill takes less than 2 seconds. No rash noted.   Psychiatric: He has a normal mood and affect.       Medical Screening Exam   See Full Note    ED Course   Procedures  Labs Reviewed - No data to display       Imaging Results              X-Ray Finger 2 or More Views Left (Final result)  Result time 05/15/23 08:44:01      Final result by Ji Quintero II, MD (05/15/23 08:44:01)                   Impression:      Soft tissue swelling.  No other significant findings.      Electronically signed by: Ji Quintero  Date:    05/15/2023  Time:    08:44               Narrative:    EXAMINATION:  XR FINGER 2 OR MORE VIEWS LEFT    CLINICAL HISTORY:  left 4th finger pain;    COMPARISON:  None available    TECHNIQUE:  XR FINGER 3 VIEWS LEFT    FINDINGS:  No  evidence of fracture seen.  The alignment of the joints appears normal.  No degenerative change is present.  Soft tissue swelling.  No other soft tissue abnormality is seen.                                       Medications   ketorolac injection 60 mg (60 mg Intramuscular Given 5/15/23 3324)     Medical Decision Making:   ED Management:  [facility]    Patient presents for emergent evaluation of acute pain left 4th finger that poses a threat to life and/or bodily function.    In the ED patient found to have acute finger sprain.    I ordered X-rays and personally reviewed them and reviewed the radiologist interpretation.  Xray significant for soft tissue swelling mild no fracture.    Have considered tenosynovitis but no tenderness of the flexor tendon.  No fusiform swelling.  Mild swelling localized to the left proximal interphalangeal joint.      Discharge [facility]  Patient was managed in the ED with IM Toradol  The response to treatment was stable.    Patient was discharged in stable condition.  Detailed return precautions discussed.                        Clinical Impression:   Final diagnoses:  [M72.111T] Sprain of interphalangeal joint of left ring finger, initial encounter (Primary)        ED Disposition Condition    Discharge Stable          ED Prescriptions    None       Follow-up Information       Follow up With Specialties Details Why Contact Info    [facility] - Emergency Department Emergency Medicine Go to  As needed, If symptoms worsen [id number] [place] [id number] [phone]             Vijay Alanis MD  05/15/23 9349

## 2023-05-15 NOTE — DISCHARGE INSTRUCTIONS
Use ibuprofen 600 mg every 6 hours and Tylenol 500 or 650 mg every 4 hours as needed for pain    Use ice pack as needed    Use finger splint as discussed    Follow-up with orthopedics    Return if symptoms worsen or new symptoms develop

## 2023-09-12 ENCOUNTER — HOSPITAL ENCOUNTER (EMERGENCY)
Facility: HOSPITAL | Age: 33
Discharge: HOME | End: 2023-09-12

## 2023-09-12 VITALS
SYSTOLIC BLOOD PRESSURE: 166 MMHG | DIASTOLIC BLOOD PRESSURE: 101 MMHG | WEIGHT: 315 LBS | RESPIRATION RATE: 20 BRPM | HEIGHT: 68 IN | BODY MASS INDEX: 47.74 KG/M2 | OXYGEN SATURATION: 98 % | HEART RATE: 95 BPM | TEMPERATURE: 99 F

## 2023-09-12 DIAGNOSIS — L73.2 AXILLARY HIDRADENITIS SUPPURATIVA: Primary | ICD-10-CM

## 2023-09-12 DIAGNOSIS — L03.114 CELLULITIS OF LEFT ARM: ICD-10-CM

## 2023-09-12 LAB
ALBUMIN SERPL BCP-MCNC: 3.8 G/DL (ref 3.5–5)
ALBUMIN/GLOB SERPL: 0.8 {RATIO}
ALP SERPL-CCNC: 117 U/L (ref 45–115)
ALT SERPL W P-5'-P-CCNC: 28 U/L (ref 16–61)
ANION GAP SERPL CALCULATED.3IONS-SCNC: 9 MMOL/L (ref 7–16)
AST SERPL W P-5'-P-CCNC: 19 U/L (ref 15–37)
BACTERIA #/AREA URNS HPF: ABNORMAL /HPF
BASOPHILS # BLD AUTO: 0.04 K/UL (ref 0–0.2)
BASOPHILS NFR BLD AUTO: 0.4 % (ref 0–1)
BILIRUB SERPL-MCNC: 0.3 MG/DL (ref ?–1.2)
BILIRUB UR QL STRIP: NEGATIVE
BUN SERPL-MCNC: 12 MG/DL (ref 7–18)
BUN/CREAT SERPL: 11 (ref 6–20)
CALCIUM SERPL-MCNC: 8.9 MG/DL (ref 8.5–10.1)
CHLORIDE SERPL-SCNC: 105 MMOL/L (ref 98–107)
CLARITY UR: CLEAR
CO2 SERPL-SCNC: 28 MMOL/L (ref 21–32)
COLOR UR: YELLOW
CREAT SERPL-MCNC: 1.11 MG/DL (ref 0.7–1.3)
DIFFERENTIAL METHOD BLD: ABNORMAL
EGFR (NO RACE VARIABLE) (RUSH/TITUS): 90 ML/MIN/1.73M2
EOSINOPHIL # BLD AUTO: 0.2 K/UL (ref 0–0.5)
EOSINOPHIL NFR BLD AUTO: 1.8 % (ref 1–4)
ERYTHROCYTE [DISTWIDTH] IN BLOOD BY AUTOMATED COUNT: 11.9 % (ref 11.5–14.5)
GLOBULIN SER-MCNC: 4.9 G/DL (ref 2–4)
GLUCOSE SERPL-MCNC: 82 MG/DL (ref 74–106)
GLUCOSE UR STRIP-MCNC: NORMAL MG/DL
HCT VFR BLD AUTO: 38.7 % (ref 40–54)
HGB BLD-MCNC: 12.4 G/DL (ref 13.5–18)
IMM GRANULOCYTES # BLD AUTO: 0.05 K/UL (ref 0–0.04)
IMM GRANULOCYTES NFR BLD: 0.5 % (ref 0–0.4)
KETONES UR STRIP-SCNC: NEGATIVE MG/DL
LEUKOCYTE ESTERASE UR QL STRIP: NEGATIVE
LYMPHOCYTES # BLD AUTO: 2.11 K/UL (ref 1–4.8)
LYMPHOCYTES NFR BLD AUTO: 19 % (ref 27–41)
MCH RBC QN AUTO: 29.1 PG (ref 27–31)
MCHC RBC AUTO-ENTMCNC: 32 G/DL (ref 32–36)
MCV RBC AUTO: 90.8 FL (ref 80–96)
MONOCYTES # BLD AUTO: 0.59 K/UL (ref 0–0.8)
MONOCYTES NFR BLD AUTO: 5.3 % (ref 2–6)
MPC BLD CALC-MCNC: 10.4 FL (ref 9.4–12.4)
MUCOUS, UA: ABNORMAL /LPF
NEUTROPHILS # BLD AUTO: 8.11 K/UL (ref 1.8–7.7)
NEUTROPHILS NFR BLD AUTO: 73 % (ref 53–65)
NITRITE UR QL STRIP: NEGATIVE
NRBC # BLD AUTO: 0 X10E3/UL
NRBC, AUTO (.00): 0 %
PH UR STRIP: 6 PH UNITS
PLATELET # BLD AUTO: 363 K/UL (ref 150–400)
POTASSIUM SERPL-SCNC: 3.8 MMOL/L (ref 3.5–5.1)
PROT SERPL-MCNC: 8.7 G/DL (ref 6.4–8.2)
PROT UR QL STRIP: 30
RBC # BLD AUTO: 4.26 M/UL (ref 4.6–6.2)
RBC # UR STRIP: ABNORMAL /UL
RBC #/AREA URNS HPF: 35 /HPF
SODIUM SERPL-SCNC: 138 MMOL/L (ref 136–145)
SP GR UR STRIP: 1.03
SQUAMOUS #/AREA URNS LPF: ABNORMAL /HPF
UROBILINOGEN UR STRIP-ACNC: 6 MG/DL
WBC # BLD AUTO: 11.1 K/UL (ref 4.5–11)
WBC #/AREA URNS HPF: 2 /HPF

## 2023-09-12 PROCEDURE — 96375 TX/PRO/DX INJ NEW DRUG ADDON: CPT

## 2023-09-12 PROCEDURE — 81001 URINALYSIS AUTO W/SCOPE: CPT | Performed by: NURSE PRACTITIONER

## 2023-09-12 PROCEDURE — 96367 TX/PROPH/DG ADDL SEQ IV INF: CPT

## 2023-09-12 PROCEDURE — 99284 EMERGENCY DEPT VISIT MOD MDM: CPT | Mod: ,,, | Performed by: NURSE PRACTITIONER

## 2023-09-12 PROCEDURE — 80053 COMPREHEN METABOLIC PANEL: CPT | Performed by: NURSE PRACTITIONER

## 2023-09-12 PROCEDURE — 63600175 PHARM REV CODE 636 W HCPCS: Performed by: NURSE PRACTITIONER

## 2023-09-12 PROCEDURE — 99284 PR EMERGENCY DEPT VISIT,LEVEL IV: ICD-10-PCS | Mod: ,,, | Performed by: NURSE PRACTITIONER

## 2023-09-12 PROCEDURE — 85025 COMPLETE CBC W/AUTO DIFF WBC: CPT | Performed by: NURSE PRACTITIONER

## 2023-09-12 PROCEDURE — 99285 EMERGENCY DEPT VISIT HI MDM: CPT | Mod: 25

## 2023-09-12 PROCEDURE — 96365 THER/PROPH/DIAG IV INF INIT: CPT

## 2023-09-12 PROCEDURE — 25000003 PHARM REV CODE 250: Performed by: NURSE PRACTITIONER

## 2023-09-12 RX ORDER — CLINDAMYCIN PHOSPHATE 600 MG/50ML
600 INJECTION, SOLUTION INTRAVENOUS
Status: COMPLETED | OUTPATIENT
Start: 2023-09-12 | End: 2023-09-12

## 2023-09-12 RX ORDER — CLINDAMYCIN HYDROCHLORIDE 150 MG/1
300 CAPSULE ORAL 4 TIMES DAILY
Qty: 56 CAPSULE | Refills: 0 | Status: SHIPPED | OUTPATIENT
Start: 2023-09-12 | End: 2023-09-19

## 2023-09-12 RX ORDER — IBUPROFEN 800 MG/1
800 TABLET ORAL 3 TIMES DAILY
Qty: 20 TABLET | Refills: 0 | Status: SHIPPED | OUTPATIENT
Start: 2023-09-12

## 2023-09-12 RX ORDER — TRAMADOL HYDROCHLORIDE 50 MG/1
50 TABLET ORAL EVERY 6 HOURS PRN
Qty: 12 TABLET | Refills: 0 | Status: SHIPPED | OUTPATIENT
Start: 2023-09-12 | End: 2023-09-20 | Stop reason: ALTCHOICE

## 2023-09-12 RX ORDER — MEPERIDINE HYDROCHLORIDE 25 MG/ML
25 INJECTION INTRAMUSCULAR; INTRAVENOUS; SUBCUTANEOUS
Status: COMPLETED | OUTPATIENT
Start: 2023-09-12 | End: 2023-09-12

## 2023-09-12 RX ADMIN — CLINDAMYCIN PHOSPHATE 600 MG: 600 INJECTION, SOLUTION INTRAVENOUS at 09:09

## 2023-09-12 RX ADMIN — MEPERIDINE HYDROCHLORIDE 25 MG: 25 INJECTION INTRAMUSCULAR; INTRAVENOUS; SUBCUTANEOUS at 09:09

## 2023-09-12 RX ADMIN — PROMETHAZINE HYDROCHLORIDE 25 MG: 25 INJECTION INTRAMUSCULAR; INTRAVENOUS at 09:09

## 2023-09-12 NOTE — Clinical Note
"Clayton "Terrellpaula Capone was seen and treated in our emergency department on 9/12/2023.  He may return to work on 09/14/2023.       If you have any questions or concerns, please don't hesitate to call.      Gisela Valdes, FNP"

## 2023-09-13 NOTE — DISCHARGE INSTRUCTIONS
Call Dr. Guerrero's office to schedule follow up appointment in general surgery clinic.  Take medications as prescribed.  Wash areas daily with antibacterial soap and water.  Return to ER with new or worsening symptoms.

## 2023-09-19 ENCOUNTER — OFFICE VISIT (OUTPATIENT)
Dept: SURGERY | Facility: CLINIC | Age: 33
End: 2023-09-19
Attending: SURGERY

## 2023-09-19 DIAGNOSIS — L73.2 SUPPURATIVE HIDRADENITIS: Primary | ICD-10-CM

## 2023-09-19 PROCEDURE — 99203 PR OFFICE/OUTPT VISIT, NEW, LEVL III, 30-44 MIN: ICD-10-PCS | Mod: S$PBB,,, | Performed by: SURGERY

## 2023-09-19 PROCEDURE — 99203 OFFICE O/P NEW LOW 30 MIN: CPT | Mod: S$PBB,,, | Performed by: SURGERY

## 2023-09-19 PROCEDURE — 99213 OFFICE O/P EST LOW 20 MIN: CPT | Mod: PBBFAC | Performed by: SURGERY

## 2023-09-19 NOTE — PROGRESS NOTES
General Surgery History and Physical      Patient ID: Clayton Capone is a 32 y.o. male.    Chief Complaint: Other (Axiallary hidrandenitis)      HPI:  32-year-old male who for many years now has been dealing with bilateral axillary hidradenitis and some infections in his groin area.  He has been to many dermatologist in the past and it has been multiple rounds of antibiotics.  Unable to afford Humira or anymore immune modulating medications.  Also he was hesitant to do this medications and would like surgical interventions.  Currently some clindamycin and they are flaring up.  Draining fever pus.  Patient is a nondiabetic.    Review of Systems   Constitutional:  Negative for activity change, appetite change, fatigue and fever.   HENT:  Negative for trouble swallowing.    Respiratory:  Negative for cough and shortness of breath.    Cardiovascular:  Negative for chest pain and palpitations.   Gastrointestinal:  Negative for abdominal distention, abdominal pain, blood in stool, constipation and diarrhea.   Genitourinary:  Negative for flank pain.   Musculoskeletal:  Negative for neck pain and neck stiffness.   Skin:  Positive for wound.   Neurological:  Negative for weakness.       Current Outpatient Medications   Medication Sig Dispense Refill    clindamycin (CLEOCIN) 150 MG capsule Take 2 capsules (300 mg total) by mouth 4 (four) times daily. for 7 days 56 capsule 0    ibuprofen (ADVIL,MOTRIN) 800 MG tablet Take 1 tablet (800 mg total) by mouth 3 (three) times daily. 20 tablet 0    loratadine (CLARITIN) 10 mg tablet Take 1 tablet (10 mg total) by mouth once daily. for 30 doses 30 tablet 0    rifAMpin (RIFADIN) 300 MG capsule Take 300 mg by mouth 2 (two) times daily.      traMADoL (ULTRAM) 50 mg tablet Take 1 tablet (50 mg total) by mouth every 6 (six) hours as needed for Pain. 12 tablet 0     No current  facility-administered medications for this visit.       Review of patient's allergies indicates:  No Known Allergies    Past Medical History:   Diagnosis Date    Cellulitis     Cyst on buttocks    Edema     Herpes simplex viral infection     Hidradenitis suppurativa     High risk sexual behavior     Hypertension     Parageusia     Suppurative hidradenitis        Past Surgical History:   Procedure Laterality Date    TONSILLECTOMY         Family History   Problem Relation Age of Onset    Cancer Other     Diabetes Other     Hypertension Other        Social History     Socioeconomic History    Marital status: Single   Tobacco Use    Smoking status: Every Day     Current packs/day: 0.50     Average packs/day: 0.5 packs/day for 5.0 years (2.5 ttl pk-yrs)     Types: Cigarettes, Vaping w/o nicotine    Smokeless tobacco: Never   Substance and Sexual Activity    Alcohol use: Not Currently     Comment: occ spirits    Drug use: Yes     Types: Marijuana     Comment: 3/4xs daily    Sexual activity: Yes     Partners: Female     Birth control/protection: None       There were no vitals filed for this visit.    Physical Exam  Constitutional:       General: He is not in acute distress.  HENT:      Head: Normocephalic.   Cardiovascular:      Rate and Rhythm: Normal rate and regular rhythm.      Pulses: Normal pulses.   Pulmonary:      Effort: Pulmonary effort is normal. No respiratory distress.      Breath sounds: Normal breath sounds.   Abdominal:      General: Abdomen is flat. There is no distension.      Palpations: Abdomen is soft.      Tenderness: There is no abdominal tenderness.   Musculoskeletal:         General: Normal range of motion.   Skin:     General: Skin is warm.      Findings: Lesion (Bilateral axillas with large 10 x 15 cm areas of open pustules and crypts with drainage.) present.   Neurological:      General: No focal deficit present.      Mental Status: He is oriented to person, place, and time.         Assessment  & Plan:    Suppurative hidradenitis        Patient would like to work out a payment plan with the hospital once that has been set up he will call back and will set up for a left axillary hidradenitis.  Risks and benefits explained to the patient clear risk of bleeding, infection, he understands he will likely have to have the area packed with some mild approximations.  Recurrence is an issue.  He understands the be a 2 month healing process.  All questions were answered.

## 2023-09-19 NOTE — PATIENT INSTRUCTIONS
To access financial services, call    [phone]    When you get something finalized, give us a call or send us a message in [employer]. We can get you scheduled at that time.

## 2023-09-20 ENCOUNTER — HOSPITAL ENCOUNTER (EMERGENCY)
Facility: HOSPITAL | Age: 33
Discharge: HOME | End: 2023-09-20

## 2023-09-20 VITALS
DIASTOLIC BLOOD PRESSURE: 103 MMHG | WEIGHT: 315 LBS | SYSTOLIC BLOOD PRESSURE: 149 MMHG | HEIGHT: 68 IN | RESPIRATION RATE: 18 BRPM | BODY MASS INDEX: 47.74 KG/M2 | OXYGEN SATURATION: 98 % | HEART RATE: 92 BPM | TEMPERATURE: 99 F

## 2023-09-20 DIAGNOSIS — M79.642 LEFT HAND PAIN: Primary | ICD-10-CM

## 2023-09-20 DIAGNOSIS — M19.90 INFLAMMATORY ARTHRITIS: ICD-10-CM

## 2023-09-20 LAB
ALBUMIN SERPL BCP-MCNC: 3.6 G/DL (ref 3.5–5)
ALBUMIN/GLOB SERPL: 0.7 {RATIO}
ALP SERPL-CCNC: 111 U/L (ref 45–115)
ALT SERPL W P-5'-P-CCNC: 20 U/L (ref 16–61)
ANION GAP SERPL CALCULATED.3IONS-SCNC: 7 MMOL/L (ref 7–16)
AST SERPL W P-5'-P-CCNC: 11 U/L (ref 15–37)
BASOPHILS # BLD AUTO: 0.04 K/UL (ref 0–0.2)
BASOPHILS NFR BLD AUTO: 0.3 % (ref 0–1)
BILIRUB SERPL-MCNC: 0.3 MG/DL (ref ?–1.2)
BUN SERPL-MCNC: 12 MG/DL (ref 7–18)
BUN/CREAT SERPL: 12 (ref 6–20)
CALCIUM SERPL-MCNC: 9.3 MG/DL (ref 8.5–10.1)
CHLORIDE SERPL-SCNC: 104 MMOL/L (ref 98–107)
CO2 SERPL-SCNC: 29 MMOL/L (ref 21–32)
CREAT SERPL-MCNC: 1.02 MG/DL (ref 0.7–1.3)
CRP SERPL-MCNC: 3.37 MG/DL (ref 0–0.8)
DIFFERENTIAL METHOD BLD: ABNORMAL
EGFR (NO RACE VARIABLE) (RUSH/TITUS): 100 ML/MIN/1.73M2
EOSINOPHIL # BLD AUTO: 0.19 K/UL (ref 0–0.5)
EOSINOPHIL NFR BLD AUTO: 1.5 % (ref 1–4)
ERYTHROCYTE [DISTWIDTH] IN BLOOD BY AUTOMATED COUNT: 11.8 % (ref 11.5–14.5)
GLOBULIN SER-MCNC: 5.1 G/DL (ref 2–4)
GLUCOSE SERPL-MCNC: 85 MG/DL (ref 74–106)
HCT VFR BLD AUTO: 39.2 % (ref 40–54)
HGB BLD-MCNC: 12.6 G/DL (ref 13.5–18)
IMM GRANULOCYTES # BLD AUTO: 0.04 K/UL (ref 0–0.04)
IMM GRANULOCYTES NFR BLD: 0.3 % (ref 0–0.4)
LYMPHOCYTES # BLD AUTO: 1.93 K/UL (ref 1–4.8)
LYMPHOCYTES NFR BLD AUTO: 15.2 % (ref 27–41)
MCH RBC QN AUTO: 28.7 PG (ref 27–31)
MCHC RBC AUTO-ENTMCNC: 32.1 G/DL (ref 32–36)
MCV RBC AUTO: 89.3 FL (ref 80–96)
MONOCYTES # BLD AUTO: 0.63 K/UL (ref 0–0.8)
MONOCYTES NFR BLD AUTO: 5 % (ref 2–6)
MPC BLD CALC-MCNC: 10 FL (ref 9.4–12.4)
NEUTROPHILS # BLD AUTO: 9.83 K/UL (ref 1.8–7.7)
NEUTROPHILS NFR BLD AUTO: 77.7 % (ref 53–65)
NRBC # BLD AUTO: 0 X10E3/UL
NRBC, AUTO (.00): 0 %
PLATELET # BLD AUTO: 384 K/UL (ref 150–400)
POTASSIUM SERPL-SCNC: 3.8 MMOL/L (ref 3.5–5.1)
PROT SERPL-MCNC: 8.7 G/DL (ref 6.4–8.2)
RBC # BLD AUTO: 4.39 M/UL (ref 4.6–6.2)
SODIUM SERPL-SCNC: 136 MMOL/L (ref 136–145)
WBC # BLD AUTO: 12.66 K/UL (ref 4.5–11)

## 2023-09-20 PROCEDURE — 96375 TX/PRO/DX INJ NEW DRUG ADDON: CPT

## 2023-09-20 PROCEDURE — 85025 COMPLETE CBC W/AUTO DIFF WBC: CPT | Performed by: NURSE PRACTITIONER

## 2023-09-20 PROCEDURE — 63600175 PHARM REV CODE 636 W HCPCS: Performed by: NURSE PRACTITIONER

## 2023-09-20 PROCEDURE — 25000003 PHARM REV CODE 250: Performed by: NURSE PRACTITIONER

## 2023-09-20 PROCEDURE — 96365 THER/PROPH/DIAG IV INF INIT: CPT

## 2023-09-20 PROCEDURE — 80053 COMPREHEN METABOLIC PANEL: CPT | Performed by: NURSE PRACTITIONER

## 2023-09-20 PROCEDURE — 99284 PR EMERGENCY DEPT VISIT,LEVEL IV: ICD-10-PCS | Mod: ,,, | Performed by: NURSE PRACTITIONER

## 2023-09-20 PROCEDURE — 99284 EMERGENCY DEPT VISIT MOD MDM: CPT

## 2023-09-20 PROCEDURE — 99284 EMERGENCY DEPT VISIT MOD MDM: CPT | Mod: ,,, | Performed by: NURSE PRACTITIONER

## 2023-09-20 PROCEDURE — 86140 C-REACTIVE PROTEIN: CPT | Performed by: NURSE PRACTITIONER

## 2023-09-20 RX ORDER — ONDANSETRON 2 MG/ML
4 INJECTION INTRAMUSCULAR; INTRAVENOUS
Status: COMPLETED | OUTPATIENT
Start: 2023-09-20 | End: 2023-09-20

## 2023-09-20 RX ORDER — DEXAMETHASONE SODIUM PHOSPHATE 4 MG/ML
4 INJECTION, SOLUTION INTRA-ARTICULAR; INTRALESIONAL; INTRAMUSCULAR; INTRAVENOUS; SOFT TISSUE
Status: COMPLETED | OUTPATIENT
Start: 2023-09-20 | End: 2023-09-20

## 2023-09-20 RX ORDER — HYDROCODONE BITARTRATE AND ACETAMINOPHEN 7.5; 325 MG/1; MG/1
1 TABLET ORAL EVERY 6 HOURS PRN
Qty: 15 TABLET | Refills: 0 | Status: ON HOLD | OUTPATIENT
Start: 2023-09-20 | End: 2023-12-08 | Stop reason: SDUPTHER

## 2023-09-20 RX ORDER — ONDANSETRON 4 MG/1
4 TABLET, FILM COATED ORAL EVERY 6 HOURS
Qty: 12 TABLET | Refills: 0 | Status: SHIPPED | OUTPATIENT
Start: 2023-09-20

## 2023-09-20 RX ORDER — MEPERIDINE HYDROCHLORIDE 25 MG/ML
25 INJECTION INTRAMUSCULAR; INTRAVENOUS; SUBCUTANEOUS
Status: COMPLETED | OUTPATIENT
Start: 2023-09-20 | End: 2023-09-20

## 2023-09-20 RX ORDER — METHYLPREDNISOLONE 4 MG/1
TABLET ORAL
Qty: 1 EACH | Refills: 0 | Status: SHIPPED | OUTPATIENT
Start: 2023-09-20 | End: 2023-10-11

## 2023-09-20 RX ADMIN — DEXTROSE MONOHYDRATE 2 G: 5 INJECTION INTRAVENOUS at 05:09

## 2023-09-20 RX ADMIN — DEXAMETHASONE SODIUM PHOSPHATE 4 MG: 4 INJECTION, SOLUTION INTRA-ARTICULAR; INTRALESIONAL; INTRAMUSCULAR; INTRAVENOUS; SOFT TISSUE at 05:09

## 2023-09-20 RX ADMIN — ONDANSETRON 4 MG: 2 INJECTION INTRAMUSCULAR; INTRAVENOUS at 04:09

## 2023-09-20 RX ADMIN — MEPERIDINE HYDROCHLORIDE 25 MG: 25 INJECTION INTRAMUSCULAR; INTRAVENOUS; SUBCUTANEOUS at 04:09

## 2023-09-20 NOTE — Clinical Note
SAUNDRA IZZY accompanied their spouse to the emergency department on 9/20/2023. They may return to work on 09/21/2023.      If you have any questions or concerns, please don't hesitate to call.      CHANCE LOVERN BSN FNP

## 2023-09-20 NOTE — Clinical Note
"Zarinaewillam "Frankie Capone was seen and treated in our emergency department on 9/20/2023.  He may return to work on 09/25/2023.       If you have any questions or concerns, please don't hesitate to call.      Yanet Boggs, FNP"

## 2023-09-20 NOTE — Clinical Note
SAUNDRA IZZY accompanied their spouse to the emergency department on 9/20/2023. They may return to work on 09/21/2023.      If you have any questions or concerns, please don't hesitate to call.      MARTÍN TOLBERTRTALISHA BSN RN

## 2023-10-14 NOTE — ED PROVIDER NOTES
Encounter Date: 9/12/2023       History     Chief Complaint   Patient presents with    Hand Pain     Patient presents to ER with complaint of left hand pain and swelling sine May.  Patient reports he has been seen several times for similar complaints.  He denies injury or accident.      The history is provided by the patient. No  was used.     Review of patient's allergies indicates:  No Known Allergies  Past Medical History:   Diagnosis Date    Cellulitis     Cyst on buttocks    Edema     Herpes simplex viral infection     Hidradenitis suppurativa     High risk sexual behavior     Hypertension     Parageusia     Suppurative hidradenitis      Past Surgical History:   Procedure Laterality Date    TONSILLECTOMY       Family History   Problem Relation Age of Onset    Cancer Other     Diabetes Other     Hypertension Other      Social History     Tobacco Use    Smoking status: Every Day     Current packs/day: 0.50     Average packs/day: 0.5 packs/day for 5.0 years (2.5 ttl pk-yrs)     Types: Cigarettes, Vaping w/o nicotine    Smokeless tobacco: Never   Substance Use Topics    Alcohol use: Not Currently     Comment: occ spirits    Drug use: Yes     Types: Marijuana     Comment: 3/4xs daily     Review of Systems   Constitutional:  Positive for activity change and fatigue.   Musculoskeletal:  Positive for joint swelling (left hand) and myalgias.   Skin:  Positive for wound (hidradentits left axilla).   All other systems reviewed and are negative.      Physical Exam     Initial Vitals [09/12/23 1722]   BP Pulse Resp Temp SpO2   (!) 166/101 95 17 98.7 °F (37.1 °C) 98 %      MAP       --         Physical Exam    Nursing note and vitals reviewed.  Constitutional: He appears well-developed and well-nourished.   HENT:   Head: Normocephalic.   Right Ear: External ear normal.   Left Ear: External ear normal.   Nose: Nose normal.   Mouth/Throat: Oropharynx is clear and moist.   Eyes: EOM are normal.   Neck: Neck  supple.   Normal range of motion.  Cardiovascular:  Normal rate, regular rhythm, normal heart sounds and intact distal pulses.           Pulmonary/Chest: Breath sounds normal.   Abdominal: Bowel sounds are normal.   Musculoskeletal:         General: Normal range of motion.      Cervical back: Normal range of motion and neck supple.     Neurological: He is alert and oriented to person, place, and time. He has normal strength. GCS score is 15. GCS eye subscore is 4. GCS verbal subscore is 5. GCS motor subscore is 6.   Skin: Skin is warm and dry. Capillary refill takes less than 2 seconds.   Psychiatric: He has a normal mood and affect. His behavior is normal. Judgment and thought content normal.         Medical Screening Exam   See Full Note    ED Course   Procedures  Labs Reviewed   COMPREHENSIVE METABOLIC PANEL - Abnormal; Notable for the following components:       Result Value    Total Protein 8.7 (*)     Globulin 4.9 (*)     Alk Phos 117 (*)     All other components within normal limits   URINALYSIS, REFLEX TO URINE CULTURE - Abnormal; Notable for the following components:    Protein, UA 30 (*)     Urobilinogen, UA 6 (*)     Blood, UA Moderate (*)     Specific Gravity, UA 1.032 (*)     All other components within normal limits   CBC WITH DIFFERENTIAL - Abnormal; Notable for the following components:    WBC 11.10 (*)     RBC 4.26 (*)     Hemoglobin 12.4 (*)     Hematocrit 38.7 (*)     Neutrophils % 73.0 (*)     Lymphocytes % 19.0 (*)     Immature Granulocytes % 0.5 (*)     Neutrophils, Abs 8.11 (*)     Immature Granulocytes, Absolute 0.05 (*)     All other components within normal limits   URINALYSIS, MICROSCOPIC - Abnormal; Notable for the following components:    RBC, UA 35 (*)     Bacteria, UA Occasional (*)     Squamous Epithelial Cells, UA Occasional (*)     Mucous Few (*)     All other components within normal limits   CBC W/ AUTO DIFFERENTIAL    Narrative:     The following orders were created for panel  order CBC auto differential.  Procedure                               Abnormality         Status                     ---------                               -----------         ------                     CBC with Differential[[id number]]       Abnormal            Final result                 Please view results for these tests on the individual orders.          Imaging Results              US Upper Extremity Veins Left (Final result)  Result time 09/12/23 20:35:29      Final result by Vijay Ceron MD (09/12/23 20:35:29)                   Impression:      Unremarkable duplex imaging of the left upper extremity.  No evidence of DVT.    Place of service: [facility]      Electronically signed by: Vijay Ceron  Date:    09/12/2023  Time:    20:35               Narrative:    EXAMINATION:  Ultrasound upper extremity veins left    CLINICAL HISTORY:  Edema/swelling left arm PET and left hand    DUPLEX SCAN OF the left upper extremity    Date: 09/12/2023    TECHNIQUE:  Duplex scan of the left upper extremity veins using the B-mode/grayscale imaging and Doppler spectral analysis and color-flow    COMPARISON:  None    FINDINGS:  Major venous structures of the left upper extremity demonstrate a normal course and caliber. There is no evidence of deep venous thrombosis. No abnormal intrinsic echogenic lesions are demonstrated in the scanned blood vessels. The visualized veins demonstrate complete compressibility with normal color Doppler flow and spectral analysis.  Mild subcutaneous edema changes are noted.    The ultrasound images were captured and stored.                                       X-Ray Hand 3 View Left (Final result)  Result time 09/12/23 19:51:50      Final result by Vijay Ceron MD (09/12/23 19:51:50)                   Impression:      As above.    Place of service: [facility]      Electronically signed by: Vijay Ceron  Date:    09/12/2023  Time:    19:51                Narrative:    EXAMINATION:  XR hand complete three views left    CLINICAL HISTORY:  Edema, swelling    TECHNIQUE:  AP, oblique and lateral    COMPARISON:  05/15/2023 radiograph    FINDINGS:  There is no acute fracture.  The joint spaces appear relatively preserved.  There is diffuse mild soft tissue swelling about the hand and proximal aspect of the fingers which is nonspecific.  No obvious osseous destruction is suggested radiographically.  No radiopaque foreign bodies are identified in the soft tissues.                                       Medications   clindamycin in D5W 600 mg/50 mL IVPB 600 mg (0 mg Intravenous Stopped 9/12/23 2215)   meperidine (PF) injection 25 mg (25 mg Intravenous Given 9/12/23 2100)   promethazine (PHENERGAN) 25 mg in dextrose 5 % (D5W) 50 mL IVPB (0 mg Intravenous Stopped 9/12/23 2120)     Medical Decision Making  Patient presents to ER with complaint of left hand pain and swelling sine May.  Patient reports he has been seen several times for similar complaints.  He denies injury or accident.        Amount and/or Complexity of Data Reviewed  Independent Historian: spouse  Labs: ordered. Decision-making details documented in ED Course.  Radiology: ordered. Decision-making details documented in ED Course.  Discussion of management or test interpretation with external provider(s): Demerol 50mg Iv to treat left arm pain  Phenergan 25 mg iv to prevent nausea  Clindamycin 600mg ivpb to treat hidradenitis  Patient is dx with left axilla hidradenitis and cellulitis of the left arm.  He is given rx for clindamycin, tramadol, and ibuprofen.      Risk  Prescription drug management.                               Clinical Impression:   Final diagnoses:  [L73.2] Axillary hidradenitis suppurativa (Primary)  [L03.114] Cellulitis of left arm        ED Disposition Condition    Discharge Stable          ED Prescriptions       Medication Sig Dispense Start Date End Date Auth. Provider    clindamycin  (CLEOCIN) 150 MG capsule () Take 2 capsules (300 mg total) by mouth 4 (four) times daily. for 7 days 56 capsule 2023 Gisela Valdes FNP    traMADoL (ULTRAM) 50 mg tablet () Take 1 tablet (50 mg total) by mouth every 6 (six) hours as needed for Pain. 12 tablet 2023 Gisela Valdes FNP    ibuprofen (ADVIL,MOTRIN) 800 MG tablet Take 1 tablet (800 mg total) by mouth 3 (three) times daily. 20 tablet 2023 -- Gisela Valdes FNP          Follow-up Information    None          Gisela Valdes FNP  10/14/23 7552     warm and dry/color normal/normal/no rashes/no ulcers

## 2023-10-18 ENCOUNTER — HOSPITAL ENCOUNTER (EMERGENCY)
Facility: HOSPITAL | Age: 33
Discharge: HOME | End: 2023-10-18
Attending: EMERGENCY MEDICINE

## 2023-10-18 VITALS
SYSTOLIC BLOOD PRESSURE: 152 MMHG | HEIGHT: 68 IN | TEMPERATURE: 99 F | DIASTOLIC BLOOD PRESSURE: 98 MMHG | BODY MASS INDEX: 47.74 KG/M2 | HEART RATE: 88 BPM | WEIGHT: 315 LBS | OXYGEN SATURATION: 97 % | RESPIRATION RATE: 18 BRPM

## 2023-10-18 DIAGNOSIS — L73.2 HIDRADENITIS SUPPURATIVA OF LEFT AXILLA: Primary | ICD-10-CM

## 2023-10-18 PROCEDURE — 99284 PR EMERGENCY DEPT VISIT,LEVEL IV: ICD-10-PCS | Mod: ,,, | Performed by: EMERGENCY MEDICINE

## 2023-10-18 PROCEDURE — 99284 EMERGENCY DEPT VISIT MOD MDM: CPT | Mod: ,,, | Performed by: EMERGENCY MEDICINE

## 2023-10-18 PROCEDURE — 96372 THER/PROPH/DIAG INJ SC/IM: CPT | Performed by: EMERGENCY MEDICINE

## 2023-10-18 PROCEDURE — 99284 EMERGENCY DEPT VISIT MOD MDM: CPT

## 2023-10-18 PROCEDURE — 63600175 PHARM REV CODE 636 W HCPCS: Performed by: EMERGENCY MEDICINE

## 2023-10-18 RX ORDER — AMPICILLIN AND SULBACTAM 2; 1 G/1; G/1
3 INJECTION, POWDER, FOR SOLUTION INTRAMUSCULAR; INTRAVENOUS
Status: COMPLETED | OUTPATIENT
Start: 2023-10-18 | End: 2023-10-18

## 2023-10-18 RX ORDER — SULFAMETHOXAZOLE AND TRIMETHOPRIM 800; 160 MG/1; MG/1
1 TABLET ORAL 2 TIMES DAILY
Qty: 20 TABLET | Refills: 0 | Status: SHIPPED | OUTPATIENT
Start: 2023-10-18 | End: 2023-10-28

## 2023-10-18 RX ORDER — METFORMIN HYDROCHLORIDE 500 MG/1
500 TABLET ORAL
Qty: 30 TABLET | Refills: 0 | Status: SHIPPED | OUTPATIENT
Start: 2023-10-18 | End: 2024-10-17

## 2023-10-18 RX ORDER — DOXYCYCLINE 100 MG/1
100 CAPSULE ORAL 2 TIMES DAILY
Qty: 20 CAPSULE | Refills: 0 | Status: SHIPPED | OUTPATIENT
Start: 2023-10-18 | End: 2023-10-28

## 2023-10-18 RX ADMIN — AMPICILLIN AND SULBACTAM 3 G: 2; 1 INJECTION, POWDER, FOR SOLUTION INTRAVENOUS at 10:10

## 2023-10-19 NOTE — ED PROVIDER NOTES
Encounter Date: 10/18/2023    SCRIBE #1 NOTE: I, Zulema Robb, am scribing for, and in the presence of,  Jose Jiang MD. I have scribed the entire note.       History     Chief Complaint   Patient presents with    Hand Pain     Pt c/o left hand pain - onset was may 2023 - pt states he has surgery scheduled December 2023 - pt dx with inflammatory arthritis 9/20/2023 er visit     32 year old male presents to the ED complaining of hand pain. The patient reports that he has been experiencing increased pain and swelling in his left hand and is Dx with inflammatory arthritis. He also states that he has pain under his left axillary due to chronic Hidradenitis suppurativa. Patient reports that he sees a dermatologist in [place] for treatment and that he is scheduled to have surgery in December of 2023. He reports that he has been taking multiple antibiotics and pain medications that have not worked to resolve his symptoms. Upon physical examination patient has multiple abscess and drainage under left axillary and swelling to the left hand.     The history is provided by the patient. No  was used.     Review of patient's allergies indicates:  No Known Allergies  Past Medical History:   Diagnosis Date    Cellulitis     Cyst on buttocks    Edema     Herpes simplex viral infection     Hidradenitis suppurativa     High risk sexual behavior     Hypertension     Parageusia     Suppurative hidradenitis      Past Surgical History:   Procedure Laterality Date    TONSILLECTOMY       Family History   Problem Relation Age of Onset    Cancer Other     Diabetes Other     Hypertension Other      Social History     Tobacco Use    Smoking status: Every Day     Current packs/day: 0.50     Average packs/day: 0.5 packs/day for 5.0 years (2.5 ttl pk-yrs)     Types: Cigarettes, Vaping w/o nicotine    Smokeless tobacco: Never   Substance Use Topics    Alcohol use: Not Currently     Comment: occ spirits    Drug use:  Yes     Types: Marijuana     Comment: 3/4xs daily     Review of Systems   Musculoskeletal:         Left hand swelling noted   Skin:         Multiple abscess and drainage under left axillary   All other systems reviewed and are negative.      Physical Exam     Initial Vitals [10/18/23 1917]   BP Pulse Resp Temp SpO2   (!) 152/98 88 18 99.1 °F (37.3 °C) 97 %      MAP       --         Physical Exam    Nursing note and vitals reviewed.  Constitutional: He appears well-developed and well-nourished. He is not diaphoretic.   HENT:   Head: Normocephalic and atraumatic.   Eyes: EOM are normal.   Neck: Neck supple.   Normal range of motion.  Cardiovascular:  Normal heart sounds.           Pulmonary/Chest: Breath sounds normal. No respiratory distress.   Abdominal: Abdomen is soft.   Musculoskeletal:      Cervical back: Normal range of motion and neck supple.     Neurological: He is alert and oriented to person, place, and time.   Skin:   Multiple abscess and drainage under left axillary consistent with Hidradenitis Suppurativa.         ED Course   Procedures  Labs Reviewed - No data to display       Imaging Results    None          Medications   ampicillin-sulbactam injection 3 g (3 g Intramuscular Given 10/18/23 2213)     Medical Decision Making  32 year old male presents to the ED complaining of hand pain. The patient reports that he has been experiencing increased pain and swelling in his left hand and is Dx with inflammatory arthritis. He also states that he has pain under his left axillary due to chronic Hidradenitis suppurativa. Patient reports that he sees a dermatologist in [place] for treatment and that he is scheduled to have surgery in December of 2023. He reports that he has been taking multiple antibiotics and pain medications that have not worked to resolve his symptoms. Upon physical examination patient has multiple abscess and drainage under left axillary and swelling to the left hand.  Physical  examinationPhysical  Exam of the Skin showed:  Multiple abscess and drainage under left axillary consistent with Hidradenitis Suppurativa.            Amount and/or Complexity of Data Reviewed  Discussion of management or test interpretation with external provider(s): Patient with with Hidradenitis Suppurativa.  I am going to start him on metformin 500 mg daily in addition to started him on antibiotics.  I recommended that the patient started smoke        Risk  Prescription drug management.              Attending Attestation:           Physician Attestation for Scribe:  Physician Attestation Statement for Scribe #1: I, Jose Jiang MD, reviewed documentation, as scribed by Zulema Robb in my presence, and it is both accurate and complete.                             Clinical Impression:   Final diagnoses:  [L73.2] Hidradenitis suppurativa of left axilla (Primary)        ED Disposition Condition    Discharge Stable          ED Prescriptions       Medication Sig Dispense Start Date End Date Auth. Provider    doxycycline (VIBRAMYCIN) 100 MG Cap Take 1 capsule (100 mg total) by mouth 2 (two) times daily. for 10 days 20 capsule 10/18/2023 10/28/2023 Jose Jiang MD    sulfamethoxazole-trimethoprim 800-160mg (BACTRIM DS) 800-160 mg Tab Take 1 tablet by mouth 2 (two) times daily. for 10 days 20 tablet 10/18/2023 10/28/2023 Jose Jiang MD    metFORMIN (GLUCOPHAGE) 500 MG tablet Take 1 tablet (500 mg total) by mouth daily with breakfast. 30 tablet 10/18/2023 10/17/2024 Jose Jiang MD          Follow-up Information    None          Jose Jiang MD  10/19/23 7184

## 2023-12-04 ENCOUNTER — OFFICE VISIT (OUTPATIENT)
Dept: SURGERY | Facility: CLINIC | Age: 33
End: 2023-12-04
Attending: SURGERY

## 2023-12-04 ENCOUNTER — CLINICAL SUPPORT (OUTPATIENT)
Dept: CARDIOLOGY | Facility: CLINIC | Age: 33
End: 2023-12-04
Attending: SURGERY

## 2023-12-04 DIAGNOSIS — L73.2 SUPPURATIVE HIDRADENITIS: Primary | ICD-10-CM

## 2023-12-04 DIAGNOSIS — Z01.818 PRE-PROCEDURAL EXAMINATION: ICD-10-CM

## 2023-12-04 PROCEDURE — 99212 OFFICE O/P EST SF 10 MIN: CPT | Mod: PBBFAC

## 2023-12-04 PROCEDURE — 93005 ELECTROCARDIOGRAM TRACING: CPT | Mod: PBBFAC | Performed by: STUDENT IN AN ORGANIZED HEALTH CARE EDUCATION/TRAINING PROGRAM

## 2023-12-04 PROCEDURE — 93010 EKG 12-LEAD: ICD-10-PCS | Mod: S$PBB,,, | Performed by: STUDENT IN AN ORGANIZED HEALTH CARE EDUCATION/TRAINING PROGRAM

## 2023-12-04 PROCEDURE — 99213 PR OFFICE/OUTPT VISIT, EST, LEVL III, 20-29 MIN: ICD-10-PCS | Mod: S$PBB,,, | Performed by: SURGERY

## 2023-12-04 PROCEDURE — 93010 ELECTROCARDIOGRAM REPORT: CPT | Mod: S$PBB,,, | Performed by: STUDENT IN AN ORGANIZED HEALTH CARE EDUCATION/TRAINING PROGRAM

## 2023-12-04 PROCEDURE — 99213 OFFICE O/P EST LOW 20 MIN: CPT | Mod: S$PBB,,, | Performed by: SURGERY

## 2023-12-04 PROCEDURE — 99214 OFFICE O/P EST MOD 30 MIN: CPT | Mod: PBBFAC | Performed by: SURGERY

## 2023-12-04 RX ORDER — SODIUM CHLORIDE 9 MG/ML
INJECTION, SOLUTION INTRAVENOUS CONTINUOUS
Status: CANCELLED | OUTPATIENT
Start: 2023-12-04

## 2023-12-04 NOTE — PATIENT INSTRUCTIONS
[facility]      Your surgery is scheduled for Friday December the 8 th at [facility] on the ground floor of the Ambulatory building. You should arrive at 6 am at the Ambulatory Care Center located at [address].                                                                                                                                                                                                                                                                                                                                                           Preoperative Instructions        Your pre-op lab work will be on today on the 1st floor of the [facility] building.  EKG is located on 2nd floor of [facility].                                                                                                                                             Day of Surgery Instructions      Bring a list of all your medications with you the day of your surgery. You can also give the list to your doctor or nurse during your final clinic appointment before surgery.      Do not eat any solid foods or drink any liquids after 12:00 AM (midnight). This includes gum, hard candy, mints, and chewing tobacco.  Medications: Take any medications specified with a small sip of water the morning of your surgery.  Brush your teeth: You may brush your teeth and rinse your mouth. Do not swallow any water or toothpaste.  Clothing: A button front shirt and loose-fitting clothes are the most comfortable before and after surgery. We also recommend low-heeled shoes.  Hair: Avoid buns, ponytails, or hairpieces at the back of the head. Remove or avoid any clips, pins or bands that bind hair. Do not use hairspray. Before going to surgery, you will need to remove any wigs or hairpieces.  We will cover your hair during surgery. Your privacy regarding personal appearance will be respected.  Fingernails:  Please be sure to remove all nail polish before you arrive for surgery. We understand that tips, wraps, gels, etc., are expensive; however, we ask these products to be removed from at least one finger on each hand. Your fingertips are used to accurately monitor your oxygen level during surgery by a device called an oximeter.  Glasses and Contact Lenses: Wear glasses when possible. If contact lenses must be worn, bring a lens case and solution. If glasses are worn, bring a case for them.  Hearing Aids: If you rely on a hearing aid, wear it to the hospital on the day of surgery. This will ensure you can hear and understand everything we need to communicate with you.  Valuables: Jewelry, including body piercings, Dentures, money, and credit cards should be left at home. [facility] is not responsible for valuables that are not secured in our surgery center.  Makeup, Perfume, Creams, Lotions and Deodorants: Do not use any of these products on the day of surgery. Remove false eyelashes prior to surgery.  Implanted Medical Devices: If you have an implanted device, such as a pacemaker or AICD, bring the device information card (if you have it) with you.  Medical Equipment: If you have been fitted for a brace to wear after surgery or you have been given crutches, bring those with you to the surgery center.  Shower: Take a shower with Hibiclens® (chlorhexidine) (available over the counter). This reduces the chance of infection. PLEASE USE CHLORHEXIDINE WASH THE NIGHT BEFORE SURGERY AND THE MORNING OF SURGERY.      Medication instructions:  You may take blood pressure medication with a small drink of water the morning of surgery.      IF YOU ARE ON ANY OF THESE BLOOD THINNERS, MAKE SURE YOUR PHYSICIAN IS AWARE.  Eliquis/Apixaban            Wafarin/Coumadin,Jantoven  Xarelto/Rivaroxaban      Pletal/Cilostazol  Plavix/Clopidogrel          Pradaxa/Dibigatran      If you are diabetic      Follow the diabetic medicine instructions you  received during your pre-operative visit.  DO NOT take your insulin or diabetic medications the morning of surgery.  When you arrive at the surgical center, be sure to tell the nurse you are diabetic.    The following blood sugar medications have to be stopped prior to surgery:    Hold 24 hours prior to surgery:    Libraglutide - Saxenda, Victoza  Lixisenatide --Adlxyin  Exenatide  --  Byetta      Hold 1 week prior to surgery:    Semaglutide - Ozempic, Wegouy, Rybelsus  Dulaglutide - Trulicity  Tirzepatide - Mounjaro  Exenatide (extended release inj)-- Bydureon BCise      Hold 48 hours prior to surgery:    Metformin, Glucovance, Metaglip, Fortamet, Glucophage, Riomet, Avandamet, Glimepiride            Other Items to bring with you and know      Insurance card  Identification card such as 's license, passport, or other picture ID  Copy of your advance directives  List of medications and allergies, if not already provided  Name and phone number of person to contact if your condition changes significantly. YOU CANNOT DRIVE YOURSELF HOME FROM THE HOSPITAL THE DAY OF SURGERY.  PLEASE UNDERSTAND THAT OUR OFFICE DOES NOT GIVE PATHOLOGY RESULTS OR TEST RESULTS OVER THE PHONE. THIS WILL BE DISCUSSED WITH YOU ON YOUR FOLLOW UP APPOINTMENT.          Alcohol and Surgery  We want to help you prepare for and recover from surgery as quickly and safely as possible. Be open and honest with your provider about how many drinks you have per day. Excessive alcohol use is defined as drinking more than three drinks per day. It can affect the outcome of your surgery. Binge drinking (consuming large amounts of alcohol infrequently, such as on weekends) can also affect the outcome of your surgery.  Alcohol withdrawal  If you drink more than three drinks a day, you could have a complication, called alcohol withdrawal, after surgery.  Alcohol withdrawal is a set of symptoms that people have when they suddenly stop drinking after using  alcohol  for a long time. During withdrawal, a person's central nervous system overreacts. This can cause mild symptoms such as shakiness, sweating or hallucinating. It can also cause other more serious side effects. If not treated properly, alcohol withdrawal can cause potentially life-threatening complications after surgery. This can include tremors, seizures, hallucinations, delirium tremors, and even death. Untreated alcohol withdrawal often leads to a longer stay in the hospital, potentially in the Intensive Care Unit.  Chronic heavy drinking also can interfere with several organ systems and biochemical processes in the body.  This interference can cause serious, even life-threatening complications.  Your care team can offer alcohol withdrawal treatment to help:  Decrease the risk of seizures and delirium tremors after surgery  Decrease the risk we will need to restrain you for your own safety or the safety of others  Decrease your risk of falling after surgery  Reduce the use of potent sedative medications  Reduce the time you stay in the hospital after surgery  Reduce the time you might spend on a mechanical ventilator to help you breathe  Lower incidence of organ failure and biochemical complications  Talk to a member of your care team or your primary care physician about your alcohol use if you feel you may be at risk of any of these complications.        Smoking and Surgery  Quitting smoking is extremely important for a successful surgery and recovery. Cigarette smoking compromises your immune system. This increases your risk of an infection after surgery. Quitting the habit before surgery will decrease the surgical risks associated with smoking.

## 2023-12-04 NOTE — H&P (VIEW-ONLY)
General Surgery History and Physical      Patient ID: Clayton Capone is a 33 y.o. male.    Chief Complaint: Other (hydragenitis)      HPI:  33-year-old male with chronic bilateral hidradenitis for many years.  He saw me a couple of months ago for consideration for excision he would a work out a payment plan with the hospital.  He has been approved for that in his ready to set a surgery any point soon as possible.  He is had another flare-up on the inferior aspect of the left axilla region with some drainage and pain and discomfort.  He also describes having some weakness in his hand with some trembling which is relatively new.  Explained to patient that is likely not going to be in fixed by his surgery.    Review of Systems   Constitutional:  Negative for activity change, appetite change, fatigue and fever.   HENT:  Negative for trouble swallowing.    Respiratory:  Negative for cough and shortness of breath.    Cardiovascular:  Negative for chest pain and palpitations.   Gastrointestinal:  Negative for abdominal distention, abdominal pain, blood in stool, constipation and diarrhea.   Genitourinary:  Negative for flank pain.   Musculoskeletal:  Negative for neck pain and neck stiffness.   Skin:  Positive for color change and wound.   Neurological:  Negative for weakness.       Current Outpatient Medications   Medication Sig Dispense Refill    HYDROcodone-acetaminophen (NORCO) 7.5-325 mg per tablet Take 1 tablet by mouth every 6 (six) hours as needed for Pain. 15 tablet 0    ibuprofen (ADVIL,MOTRIN) 800 MG tablet Take 1 tablet (800 mg total) by mouth 3 (three) times daily. 20 tablet 0    loratadine (CLARITIN) 10 mg tablet Take 1 tablet (10 mg total) by mouth once daily. for 30 doses 30 tablet 0    metFORMIN (GLUCOPHAGE) 500 MG tablet Take 1 tablet (500 mg total) by mouth daily with breakfast. 30 tablet 0    ondansetron  (ZOFRAN) 4 MG tablet Take 1 tablet (4 mg total) by mouth every 6 (six) hours. 12 tablet 0    rifAMpin (RIFADIN) 300 MG capsule Take 300 mg by mouth 2 (two) times daily.       No current facility-administered medications for this visit.       Review of patient's allergies indicates:  No Known Allergies    Past Medical History:   Diagnosis Date    Cellulitis     Cyst on buttocks    Edema     Herpes simplex viral infection     Hidradenitis suppurativa     High risk sexual behavior     Hypertension     Parageusia     Suppurative hidradenitis        Past Surgical History:   Procedure Laterality Date    TONSILLECTOMY         Family History   Problem Relation Age of Onset    Cancer Other     Diabetes Other     Hypertension Other        Social History     Socioeconomic History    Marital status: Single   Tobacco Use    Smoking status: Every Day     Current packs/day: 0.50     Average packs/day: 0.5 packs/day for 5.0 years (2.5 ttl pk-yrs)     Types: Cigarettes, Vaping w/o nicotine    Smokeless tobacco: Never   Substance and Sexual Activity    Alcohol use: Not Currently     Comment: occ spirits    Drug use: Yes     Types: Marijuana     Comment: 3/4xs daily    Sexual activity: Yes     Partners: Female     Birth control/protection: None       There were no vitals filed for this visit.    Physical Exam  Constitutional:       General: He is not in acute distress.  HENT:      Head: Normocephalic.   Cardiovascular:      Rate and Rhythm: Normal rate and regular rhythm.      Pulses: Normal pulses.   Pulmonary:      Effort: Pulmonary effort is normal. No respiratory distress.      Breath sounds: Normal breath sounds.   Abdominal:      General: Abdomen is flat. There is no distension.      Palpations: Abdomen is soft.      Tenderness: There is no abdominal tenderness.   Musculoskeletal:         General: Normal range of motion.   Skin:     General: Skin is warm.      Findings: Lesion (Large area in the left axilla region with chronic  hidradenitis.  Draining sinuses tenderness and openings.) present.   Neurological:      General: No focal deficit present.      Mental Status: He is oriented to person, place, and time.         Assessment & Plan:    Suppurative hidradenitis        Patient to go to the OR this Friday for excision of left axillary hidradenitis.  Risks and benefits explained to the patient including risk of bleeding, infection, prolonged healing if we likely have to pack this at least 2 months.  He will plan for a right-sided repair in a couple of weeks to months after the original excision.  All questions were answered.

## 2023-12-08 ENCOUNTER — ANESTHESIA EVENT (OUTPATIENT)
Dept: SURGERY | Facility: HOSPITAL | Age: 33
End: 2023-12-08

## 2023-12-08 ENCOUNTER — HOSPITAL ENCOUNTER (OUTPATIENT)
Facility: HOSPITAL | Age: 33
Discharge: HOME | End: 2023-12-08
Attending: SURGERY | Admitting: SURGERY

## 2023-12-08 ENCOUNTER — ANESTHESIA (OUTPATIENT)
Dept: SURGERY | Facility: HOSPITAL | Age: 33
End: 2023-12-08

## 2023-12-08 VITALS
HEART RATE: 73 BPM | BODY MASS INDEX: 49.44 KG/M2 | OXYGEN SATURATION: 95 % | SYSTOLIC BLOOD PRESSURE: 119 MMHG | RESPIRATION RATE: 16 BRPM | WEIGHT: 315 LBS | HEIGHT: 67 IN | TEMPERATURE: 98 F | DIASTOLIC BLOOD PRESSURE: 65 MMHG

## 2023-12-08 DIAGNOSIS — L73.2 SUPPURATIVE HIDRADENITIS: Primary | ICD-10-CM

## 2023-12-08 PROCEDURE — 36000707: Performed by: SURGERY

## 2023-12-08 PROCEDURE — 27000177 HC AIRWAY, LARYNGEAL MASK: Performed by: ANESTHESIOLOGY

## 2023-12-08 PROCEDURE — 88304 TISSUE EXAM BY PATHOLOGIST: CPT | Mod: 26,,, | Performed by: PATHOLOGY

## 2023-12-08 PROCEDURE — 63600175 PHARM REV CODE 636 W HCPCS

## 2023-12-08 PROCEDURE — 63600175 PHARM REV CODE 636 W HCPCS: Performed by: ANESTHESIOLOGY

## 2023-12-08 PROCEDURE — 88304 TISSUE EXAM BY PATHOLOGIST: CPT | Mod: TC,SUR | Performed by: SURGERY

## 2023-12-08 PROCEDURE — 71000033 HC RECOVERY, INTIAL HOUR: Performed by: SURGERY

## 2023-12-08 PROCEDURE — 37000008 HC ANESTHESIA 1ST 15 MINUTES: Performed by: SURGERY

## 2023-12-08 PROCEDURE — 11450 PR EXC SWEAT GLAND LESN AXILL,SIMPL: ICD-10-PCS | Mod: LT,,, | Performed by: SURGERY

## 2023-12-08 PROCEDURE — D9220A PRA ANESTHESIA: Mod: ,,, | Performed by: ANESTHESIOLOGY

## 2023-12-08 PROCEDURE — 27000655: Performed by: ANESTHESIOLOGY

## 2023-12-08 PROCEDURE — 27000510 HC BLANKET BAIR HUGGER ANY SIZE: Performed by: ANESTHESIOLOGY

## 2023-12-08 PROCEDURE — 25000003 PHARM REV CODE 250

## 2023-12-08 PROCEDURE — 27000716 HC OXISENSOR PROBE, ANY SIZE: Performed by: ANESTHESIOLOGY

## 2023-12-08 PROCEDURE — 37000009 HC ANESTHESIA EA ADD 15 MINS: Performed by: SURGERY

## 2023-12-08 PROCEDURE — 11450 EXC SKN HDRDNT AX SMPL/NTRM: CPT | Mod: LT,,, | Performed by: SURGERY

## 2023-12-08 PROCEDURE — 71000015 HC POSTOP RECOV 1ST HR: Performed by: SURGERY

## 2023-12-08 PROCEDURE — 88304 SURGICAL PATHOLOGY: ICD-10-PCS | Mod: 26,,, | Performed by: PATHOLOGY

## 2023-12-08 PROCEDURE — D9220A PRA ANESTHESIA: ICD-10-PCS | Mod: ,,, | Performed by: ANESTHESIOLOGY

## 2023-12-08 PROCEDURE — 36000706: Performed by: SURGERY

## 2023-12-08 PROCEDURE — 71000016 HC POSTOP RECOV ADDL HR: Performed by: SURGERY

## 2023-12-08 PROCEDURE — 25000003 PHARM REV CODE 250: Performed by: SURGERY

## 2023-12-08 RX ORDER — MORPHINE SULFATE 10 MG/ML
4 INJECTION INTRAMUSCULAR; INTRAVENOUS; SUBCUTANEOUS EVERY 5 MIN PRN
Status: DISCONTINUED | OUTPATIENT
Start: 2023-12-08 | End: 2023-12-08 | Stop reason: HOSPADM

## 2023-12-08 RX ORDER — MIDAZOLAM HYDROCHLORIDE 1 MG/ML
INJECTION INTRAMUSCULAR; INTRAVENOUS
Status: DISCONTINUED | OUTPATIENT
Start: 2023-12-08 | End: 2023-12-08

## 2023-12-08 RX ORDER — KETAMINE HYDROCHLORIDE 50 MG/ML
INJECTION, SOLUTION INTRAMUSCULAR; INTRAVENOUS
Status: DISCONTINUED | OUTPATIENT
Start: 2023-12-08 | End: 2023-12-08

## 2023-12-08 RX ORDER — HYDROCODONE BITARTRATE AND ACETAMINOPHEN 7.5; 325 MG/1; MG/1
1 TABLET ORAL EVERY 6 HOURS PRN
Qty: 25 TABLET | Refills: 0 | Status: SHIPPED | OUTPATIENT
Start: 2023-12-08

## 2023-12-08 RX ORDER — SODIUM CHLORIDE 9 MG/ML
INJECTION, SOLUTION INTRAVENOUS CONTINUOUS
Status: DISCONTINUED | OUTPATIENT
Start: 2023-12-08 | End: 2023-12-08 | Stop reason: HOSPADM

## 2023-12-08 RX ORDER — DIPHENHYDRAMINE HYDROCHLORIDE 50 MG/ML
25 INJECTION INTRAMUSCULAR; INTRAVENOUS EVERY 6 HOURS PRN
Status: DISCONTINUED | OUTPATIENT
Start: 2023-12-08 | End: 2023-12-08 | Stop reason: HOSPADM

## 2023-12-08 RX ORDER — PROPOFOL 10 MG/ML
VIAL (ML) INTRAVENOUS
Status: DISCONTINUED | OUTPATIENT
Start: 2023-12-08 | End: 2023-12-08

## 2023-12-08 RX ORDER — LIDOCAINE HYDROCHLORIDE 20 MG/ML
INJECTION INTRAVENOUS
Status: DISCONTINUED | OUTPATIENT
Start: 2023-12-08 | End: 2023-12-08

## 2023-12-08 RX ORDER — OXYCODONE HYDROCHLORIDE 5 MG/1
10 TABLET ORAL ONCE
Status: COMPLETED | OUTPATIENT
Start: 2023-12-08 | End: 2023-12-08

## 2023-12-08 RX ORDER — PHENYLEPHRINE HYDROCHLORIDE 10 MG/ML
INJECTION INTRAVENOUS
Status: DISCONTINUED | OUTPATIENT
Start: 2023-12-08 | End: 2023-12-08

## 2023-12-08 RX ORDER — MEPERIDINE HYDROCHLORIDE 25 MG/ML
25 INJECTION INTRAMUSCULAR; INTRAVENOUS; SUBCUTANEOUS EVERY 10 MIN PRN
Status: DISCONTINUED | OUTPATIENT
Start: 2023-12-08 | End: 2023-12-08 | Stop reason: HOSPADM

## 2023-12-08 RX ORDER — FENTANYL CITRATE 50 UG/ML
INJECTION, SOLUTION INTRAMUSCULAR; INTRAVENOUS
Status: DISCONTINUED | OUTPATIENT
Start: 2023-12-08 | End: 2023-12-08

## 2023-12-08 RX ORDER — ONDANSETRON 2 MG/ML
4 INJECTION INTRAMUSCULAR; INTRAVENOUS DAILY PRN
Status: DISCONTINUED | OUTPATIENT
Start: 2023-12-08 | End: 2023-12-08 | Stop reason: HOSPADM

## 2023-12-08 RX ORDER — ONDANSETRON 2 MG/ML
INJECTION INTRAMUSCULAR; INTRAVENOUS
Status: DISCONTINUED | OUTPATIENT
Start: 2023-12-08 | End: 2023-12-08

## 2023-12-08 RX ORDER — CEFAZOLIN SODIUM 1 G/3ML
INJECTION, POWDER, FOR SOLUTION INTRAMUSCULAR; INTRAVENOUS
Status: DISCONTINUED | OUTPATIENT
Start: 2023-12-08 | End: 2023-12-08

## 2023-12-08 RX ORDER — IPRATROPIUM BROMIDE AND ALBUTEROL SULFATE 2.5; .5 MG/3ML; MG/3ML
3 SOLUTION RESPIRATORY (INHALATION) ONCE
Status: DISCONTINUED | OUTPATIENT
Start: 2023-12-08 | End: 2023-12-08 | Stop reason: HOSPADM

## 2023-12-08 RX ORDER — HYDROMORPHONE HYDROCHLORIDE 2 MG/ML
0.5 INJECTION, SOLUTION INTRAMUSCULAR; INTRAVENOUS; SUBCUTANEOUS EVERY 5 MIN PRN
Status: DISCONTINUED | OUTPATIENT
Start: 2023-12-08 | End: 2023-12-08 | Stop reason: HOSPADM

## 2023-12-08 RX ADMIN — ONDANSETRON 4 MG: 2 INJECTION INTRAMUSCULAR; INTRAVENOUS at 07:12

## 2023-12-08 RX ADMIN — PHENYLEPHRINE HYDROCHLORIDE 200 MCG: 10 INJECTION INTRAVENOUS at 08:12

## 2023-12-08 RX ADMIN — MORPHINE SULFATE 4 MG: 10 INJECTION, SOLUTION INTRAMUSCULAR; INTRAVENOUS at 08:12

## 2023-12-08 RX ADMIN — MIDAZOLAM HYDROCHLORIDE 2 MG: 1 INJECTION, SOLUTION INTRAMUSCULAR; INTRAVENOUS at 07:12

## 2023-12-08 RX ADMIN — OXYCODONE HYDROCHLORIDE 10 MG: 5 TABLET ORAL at 09:12

## 2023-12-08 RX ADMIN — FENTANYL CITRATE 50 MCG: 50 INJECTION INTRAMUSCULAR; INTRAVENOUS at 07:12

## 2023-12-08 RX ADMIN — LIDOCAINE HYDROCHLORIDE 100 MG: 20 INJECTION, SOLUTION INTRAVENOUS at 07:12

## 2023-12-08 RX ADMIN — FENTANYL CITRATE 50 MCG: 50 INJECTION INTRAMUSCULAR; INTRAVENOUS at 08:12

## 2023-12-08 RX ADMIN — KETAMINE HYDROCHLORIDE 25 MG: 50 INJECTION INTRAMUSCULAR; INTRAVENOUS at 07:12

## 2023-12-08 RX ADMIN — PROPOFOL 150 MG: 10 INJECTION, EMULSION INTRAVENOUS at 07:12

## 2023-12-08 RX ADMIN — CEFAZOLIN 3 G: 1 INJECTION, POWDER, FOR SOLUTION INTRAMUSCULAR; INTRAVENOUS; PARENTERAL at 07:12

## 2023-12-08 RX ADMIN — PHENYLEPHRINE HYDROCHLORIDE 100 MCG: 10 INJECTION INTRAVENOUS at 08:12

## 2023-12-08 RX ADMIN — SODIUM CHLORIDE: 9 INJECTION, SOLUTION INTRAVENOUS at 07:12

## 2023-12-08 NOTE — OP NOTE
[facility]  Surgery Department  Operative Note    SUMMARY     Date of Procedure: 12/8/2023     Procedure: Procedure(s) (LRB):  EXCISION, HIDRADENITIS (Left)     Surgeon(s) and Role:     * Onesimo Guerrero MD - Primary    Assisting Surgeon: None    Pre-Operative Diagnosis: Suppurative hidradenitis [L73.2]    Post-Operative Diagnosis: Post-Op Diagnosis Codes:     * Suppurative hidradenitis [L73.2]    Anesthesia: General    Procedures Performed: left axillary hidradenitis excision    Significant Findings of the Procedure: large 25 x 16 x 5 cm area of left axilla skin removed    Procedure in Detail: after informed consent left axilla prepped and draped in usual sterile fashion.  Marked out entire area of hidradenitis.  We then elipsed out 25 x 16 x 5 cm area of skin and sub cutaneous tissue all the way down to clean healthy fat.  We ensure hemostasis and closed deep layer with 2-0 vicryl and loosly closed skin with 2 nylons.  Middle portion left open.  Instructed patient to allow to granulate in    Complications: No    Estimated Blood Loss (EBL): 50 mL           Implants: * No implants in log *    Specimens:   Specimen (24h ago, onward)       Start     Ordered    12/08/23 0805  Surgical Pathology  RELEASE UPON ORDERING         12/08/23 0805                            Condition: Good    Disposition: PACU - hemodynamically stable.    Attestation: I was present and scrubbed for the entire procedure.

## 2023-12-08 NOTE — OR NURSING
0836 REC'D TO PACU IN STABLE COND. PT SLEEPING, WAKES TO VOICE. CONNECTED TO BP CUFF. EKG LEADS & SPO2 MONITORS. VS STABLE PT RESTING WELL, NO DISTRESS NOTED @ THIS TIME. WILL CONT TO MONITOR.      0910 TRANSFERRED PT BACK TO ROOM 3 IN STABLE COND, BEDSIDE REPORT GIVEN TO NUNU MCKAY RN. NO DISTRESS NOTED @ THIS TIME. VS STABLE  100%  68  119/65

## 2023-12-08 NOTE — TRANSFER OF CARE
"Anesthesia Transfer of Care Note    Patient: Clayton Capone    Procedure(s) Performed: Procedure(s) (LRB):  EXCISION, HIDRADENITIS (Left)    Patient location: PACU    Anesthesia Type: general    Transport from OR: Transported from OR on 2-3 L/min O2 by NC with adequate spontaneous ventilation    Post pain: adequate analgesia    Post assessment: no apparent anesthetic complications    Post vital signs: stable    Level of consciousness: responds to stimulation    Nausea/Vomiting: no nausea/vomiting    Complications: none    Transfer of care protocol was followed      Last vitals: Visit Vitals  /60 (BP Location: Right arm, Patient Position: Lying)   Pulse 81   Temp 36.6 °C (97.8 °F) (Oral)   Resp 20   Ht 5' 7" (1.702 m)   Wt (!) 149.7 kg (330 lb)   SpO2 99%   BMI 51.69 kg/m²     "

## 2023-12-08 NOTE — ANESTHESIA PROCEDURE NOTES
LMA    Date/Time: 12/8/2023 7:44 AM    Performed by: Lit Ortiz II, CRNA  Authorized by: Néstor Pal MD    Intubation:     Induction:  Intravenous    Intubated:  Postinduction    Mask Ventilation:  Not attempted    Attempts:  1    Attempted By:  CRNA    Difficult Airway Encountered?: No      Complications:  None    Airway Device:  Supraglottic airway/LMA    Airway Device Size:  5.0    Style/Cuff Inflation:  Cuffed (inflated to minimal occlusive pressure)    Secured at:  The lips    Placement Verified By:  Capnometry    Complicating Factors:  None    Findings Post-Intubation:  BS equal bilateral and atraumatic/condition of teeth unchanged

## 2023-12-08 NOTE — ANESTHESIA PREPROCEDURE EVALUATION
12/08/2023  Clayton Capone is a 33 y.o., male.      Pre-op Assessment    I have reviewed the Patient Summary Reports.     I have reviewed the Nursing Notes. I have reviewed the NPO Status.   I have reviewed the Medications.     Review of Systems  Anesthesia Hx:             Denies Family Hx of Anesthesia complications.    Denies Personal Hx of Anesthesia complications.                    Social:  No Alcohol Use, Smoker       Hematology/Oncology:  Hematology Normal   Oncology Normal                                   EENT/Dental:  EENT/Dental Normal           Cardiovascular:      Denies Hypertension.                                        Pulmonary:  Pulmonary Normal                       Renal/:  Renal/ Normal                 Hepatic/GI:  Hepatic/GI Normal                 Musculoskeletal:  Musculoskeletal Normal                Neurological:  Neurology Normal                                      Endocrine:  Diabetes         Morbid Obesity / BMI > 40  Dermatological:  Skin Normal    Psych:  Psychiatric Normal                    Physical Exam  General: Well nourished, Cooperative, Alert and Oriented    Airway:  Mallampati: III / III  Mouth Opening: Normal  TM Distance: Normal  Neck ROM: Normal ROM    Dental:  Intact    Chest/Lungs:  Clear to auscultation    Heart:  Rate: Normal  Rhythm: Regular Rhythm  Sounds: Normal        Chemistry        Component Value Date/Time     12/04/2023 1433    K 3.6 12/04/2023 1433     12/04/2023 1433    CO2 31 12/04/2023 1433    BUN 15 12/04/2023 1433    CREATININE 1.16 12/04/2023 1433    GLU 87 12/04/2023 1433        Component Value Date/Time    CALCIUM 9.4 12/04/2023 1433    ALKPHOS 111 09/20/2023 1559    AST 11 (L) 09/20/2023 1559    ALT 20 09/20/2023 1559    BILITOT 0.3 09/20/2023 1559        Lab Results   Component Value Date    WBC 8.99 12/04/2023    HGB 12.6  Patient arrived to the unit reporting that she is feeling well  She had a port placed yesterday  The port appears ecchymotic  A peripheral IV was placed and labs were drawn  (L) 12/04/2023    HCT 39.8 (L) 12/04/2023     12/04/2023     Results for orders placed or performed in visit on 12/04/23   EKG 12-lead    Collection Time: 12/04/23  2:20 PM    Narrative    Test Reason : Z01.818,    Vent. Rate : 080 BPM     Atrial Rate : 080 BPM     P-R Int : 168 ms          QRS Dur : 102 ms      QT Int : 376 ms       P-R-T Axes : 044 004 004 degrees     QTc Int : 433 ms    Normal sinus rhythm  Normal ECG  No previous ECGs available  Confirmed by Chloé FAJARDO, Rudy WILLARD ([id number]) on 12/6/2023 5:25:26 PM    Referred By: TRANG GILES           Confirmed By:Rudy Luevano MD         Anesthesia Plan  Type of Anesthesia, risks & benefits discussed:    Anesthesia Type: Gen Supraglottic Airway  Intra-op Monitoring Plan: Standard ASA Monitors  Post Op Pain Control Plan: multimodal analgesia  Induction:  IV  Airway Plan: Direct  Informed Consent: Informed consent signed with the Patient and all parties understand the risks and agree with anesthesia plan.  All questions answered.   ASA Score: 3  Day of Surgery Review of History & Physical: H&P Update referred to the surgeon/provider.I have interviewed and examined the patient. I have reviewed the patient's H&P dated: There are no significant changes.     Ready For Surgery From Anesthesia Perspective.     .

## 2023-12-08 NOTE — DISCHARGE SUMMARY
[facility] - [facility]  Discharge Note  Short Stay    Procedure(s) (LRB):  EXCISION, HIDRADENITIS (Left)      OUTCOME: Patient tolerated treatment/procedure well without complication and is now ready for discharge.    DISPOSITION: Home or Self Care    FINAL DIAGNOSIS:  hidradenitis of left axilla    FOLLOWUP: In clinic    DISCHARGE INSTRUCTIONS:    Discharge Procedure Orders   Diet Adult Regular     Remove dressing in 24 hours     Notify your health care provider if you experience any of the following:  temperature >100.4     Notify your health care provider if you experience any of the following:  persistent nausea and vomiting or diarrhea     Notify your health care provider if you experience any of the following:  severe uncontrolled pain     Notify your health care provider if you experience any of the following:  redness, tenderness, or signs of infection (pain, swelling, redness, odor or green/yellow discharge around incision site)     Notify your health care provider if you experience any of the following:  difficulty breathing or increased cough     Notify your health care provider if you experience any of the following:  severe persistent headache     Notify your health care provider if you experience any of the following:  worsening rash     Notify your health care provider if you experience any of the following:  persistent dizziness, light-headedness, or visual disturbances     Notify your health care provider if you experience any of the following:  increased confusion or weakness     Notify your health care provider if you experience any of the following:     Shower on day dressing removed (No bath)   Order Comments: Change dressing to left axilla qdaily with gauze packing.         Clinical Reference Documents Added to Patient Instructions         Document    HIDRADENITIS SUPPURATIVA (ENGLISH)            TIME SPENT ON DISCHARGE: 5 minutes

## 2023-12-09 NOTE — ANESTHESIA POSTPROCEDURE EVALUATION
Anesthesia Post Evaluation    Patient: Clayton Capone    Procedure(s) Performed: Procedure(s) (LRB):  EXCISION, HIDRADENITIS (Left)    Final Anesthesia Type: general      Patient location during evaluation: PACU  Patient participation: Yes- Able to Participate  Level of consciousness: awake and alert  Post-procedure vital signs: reviewed and stable  Pain management: adequate  Airway patency: patent  TRELL mitigation strategies: Multimodal analgesia  PONV status at discharge: No PONV  Anesthetic complications: no      Cardiovascular status: blood pressure returned to baseline  Respiratory status: unassisted  Hydration status: euvolemic  Follow-up not needed.              Vitals Value Taken Time   /65 12/08/23 0955   Temp 36.6 °C (97.8 °F) 12/08/23 0841   Pulse 73 12/08/23 0945   Resp 16 12/08/23 0958   SpO2 95 % 12/08/23 0945         Event Time   Out of Recovery 09:10:00         Pain/Antonieta Score: Pain Rating Prior to Med Admin: 5 (12/8/2023  9:58 AM)  Pain Rating Post Med Admin: 3 (12/8/2023  9:05 AM)  Antonieta Score: 10 (12/8/2023  9:55 AM)  Modified Antonieta Score: 20 (12/8/2023  9:55 AM)

## 2023-12-11 LAB
ESTROGEN SERPL-MCNC: NORMAL PG/ML
INSULIN SERPL-ACNC: NORMAL U[IU]/ML
LAB AP GROSS DESCRIPTION: NORMAL
LAB AP LABORATORY NOTES: NORMAL
T3RU NFR SERPL: NORMAL %

## 2023-12-13 ENCOUNTER — TELEPHONE (OUTPATIENT)
Dept: SURGERY | Facility: CLINIC | Age: 33
End: 2023-12-13

## 2023-12-13 NOTE — TELEPHONE ENCOUNTER
Called pt to tell him his Ultram was being prescribed. He said pain was more when he changed the dressing and when going to bed. I told him to adjust the times he was changing the dressing as in change it one hour after taking an Ultram. He said he would adjust the dressing change schedule. He stated he had no further questions.

## 2023-12-13 NOTE — TELEPHONE ENCOUNTER
----- Message from Mirtha Alvarez sent at 12/13/2023 11:08 AM CST -----  Pt calling to see if he can get something called in stronger for pain - call back # [phone] - jessica baxtery 38

## 2023-12-19 ENCOUNTER — TELEPHONE (OUTPATIENT)
Dept: SURGERY | Facility: CLINIC | Age: 33
End: 2023-12-19

## 2023-12-19 ENCOUNTER — OFFICE VISIT (OUTPATIENT)
Dept: SURGERY | Facility: CLINIC | Age: 33
End: 2023-12-19
Attending: SURGERY

## 2023-12-19 DIAGNOSIS — L73.2 SUPPURATIVE HIDRADENITIS: Primary | ICD-10-CM

## 2023-12-19 PROCEDURE — 99211 OFF/OP EST MAY X REQ PHY/QHP: CPT | Mod: PBBFAC | Performed by: SURGERY

## 2023-12-19 PROCEDURE — 99024 PR POST-OP FOLLOW-UP VISIT: ICD-10-PCS | Mod: ,,, | Performed by: SURGERY

## 2023-12-19 PROCEDURE — 99024 POSTOP FOLLOW-UP VISIT: CPT | Mod: ,,, | Performed by: SURGERY

## 2023-12-19 NOTE — TELEPHONE ENCOUNTER
Pt called stating two of his stiches popped and he was bleeding a good bit. He asked if he could come to the clinic. I told him to come to the Third floor and will see Dr. Guerrero as soon as he arrives

## 2023-12-19 NOTE — PROGRESS NOTES
Post-operative Note    HPI:  The patient is status post left axillary hidradenitis excision.  Patient had 1 of his stitches on his retention sutures pop and he had some bleeding.  It stopped on its own and he came in just for a wound check.  He has been changing the packing daily with some gauze no fever no chills no purulent drainage mild odor.    PHYSICAL EXAM:    Incision filling in well.  Still with a good ways to go good granulation tissue.  No purulence or issues.    Recent Results (from the past 504 hour(s))   Basic Metabolic Panel    Collection Time: 12/04/23  2:33 PM   Result Value Ref Range    Sodium 138 136 - 145 mmol/L    Potassium 3.6 3.5 - 5.1 mmol/L    Chloride 103 98 - 107 mmol/L    CO2 31 21 - 32 mmol/L    Anion Gap 8 7 - 16 mmol/L    Glucose 87 74 - 106 mg/dL    BUN 15 7 - 18 mg/dL    Creatinine 1.16 0.70 - 1.30 mg/dL    BUN/Creatinine Ratio 13 6 - 20    Calcium 9.4 8.5 - 10.1 mg/dL    eGFR 85 >=60 mL/min/1.73m2   CBC with Differential    Collection Time: 12/04/23  2:33 PM   Result Value Ref Range    WBC 8.99 4.50 - 11.00 K/uL    RBC 4.32 (L) 4.60 - 6.20 M/uL    Hemoglobin 12.6 (L) 13.5 - 18.0 g/dL    Hematocrit 39.8 (L) 40.0 - 54.0 %    MCV 92.1 80.0 - 96.0 fL    MCH 29.2 27.0 - 31.0 pg    MCHC 31.7 (L) 32.0 - 36.0 g/dL    RDW 12.3 11.5 - 14.5 %    Platelet Count 326 150 - 400 K/uL    MPV 10.1 9.4 - 12.4 fL    Neutrophils % 66.9 (H) 53.0 - 65.0 %    Lymphocytes % 20.8 (L) 27.0 - 41.0 %    Monocytes % 8.7 (H) 2.0 - 6.0 %    Eosinophils % 2.9 1.0 - 4.0 %    Basophils % 0.3 0.0 - 1.0 %    Immature Granulocytes % 0.4 0.0 - 0.4 %    nRBC, Auto 0.0 <=0.0 %    Neutrophils, Abs 6.01 1.80 - 7.70 K/uL    Lymphocytes, Absolute 1.87 1.00 - 4.80 K/uL    Monocytes, Absolute 0.78 0.00 - 0.80 K/uL    Eosinophils, Absolute 0.26 0.00 - 0.50 K/uL    Basophils, Absolute 0.03 0.00 - 0.20 K/uL    Immature Granulocytes, Absolute 0.04  0.00 - 0.04 K/uL    nRBC, Absolute 0.00 <=0.00 x10e3/uL    Diff Type Auto    Surgical Pathology    Collection Time: 12/08/23  8:04 AM   Result Value Ref Range    Case Report       Surgical Pathology                                Case: [id number]                                   Authorizing Provider:  Onesimo Guerrero MD       Collected:           12/08/2023 08:04 AM          Ordering Location:     [facility] -     Received:            12/08/2023 09:04 AM                                 Periop Services                                                              Pathologist:           Norris Wilson MD                                                      Specimen:    Axilla, Left, left axillary hidradentis                                                    Final Diagnosis       A. Left axillary hidradenitis, excision:  - Skin and underlying soft tissue with an epidermal inclusion cyst and chronic abscess formation consistent with hidradenitis      Gross Description       A. Axilla, Left, left axillary hidradentis:   The specimen is received in formalin as axilla, left-left axillary hidradenitis and consists of a portion of debrided skin and subcutaneous tissue measuring 20.5 x 14 x 2.6 cm.  The skin surface is brown and wrinkled with scattered hemorrhagic/pink, ulcerated areas measuring up to 1.0 cm.  Sectioning reveals multiple hemorrhagic-pink areas measuring up to 1.0 cm.  Representative sections are submitted in block A1.    Grossing was completed by Néstor Kang.      Microscopic Description       A microscopic examination was performed and the diagnosis reflects the findings.          Laboratory Notes       If this report includes immunohistochemical (IHC) test results, please note the following: IHC studies were interpreted in conjunction with appropriate positive and negative controls which demonstrate the expected positive and negative reactivity. This laboratory is regulated under CLIA  as qualified to perform high-complexity testing. IHC tests are used for clinical purposes. They should not be regarded as investigational or research.            ASSESSMENT:      The patient is doing well after surgery.       PLAN:      Follow up in one month.    Patient is set to come back in a few weeks.  Continue wet-to-dry.  Okay to shower.  Any oozing will be addressed with some pressure dressings.  Will remove this stitches then.  Patient counseled on arm exercise to allow for better mobilization and raising up the arm.

## 2024-01-08 ENCOUNTER — TELEPHONE (OUTPATIENT)
Dept: SURGERY | Facility: CLINIC | Age: 34
End: 2024-01-08

## 2024-01-16 ENCOUNTER — TELEPHONE (OUTPATIENT)
Dept: SURGERY | Facility: CLINIC | Age: 34
End: 2024-01-16

## 2024-01-22 ENCOUNTER — OFFICE VISIT (OUTPATIENT)
Dept: SURGERY | Facility: CLINIC | Age: 34
End: 2024-01-22
Attending: SURGERY

## 2024-01-22 DIAGNOSIS — Z09 FOLLOW-UP EXAMINATION, FOLLOWING OTHER SURGERY: Primary | ICD-10-CM

## 2024-01-22 PROCEDURE — 99212 OFFICE O/P EST SF 10 MIN: CPT | Mod: PBBFAC | Performed by: SURGERY

## 2024-01-22 PROCEDURE — 99024 POSTOP FOLLOW-UP VISIT: CPT | Mod: ,,, | Performed by: SURGERY

## 2024-01-22 RX ORDER — TRAMADOL HYDROCHLORIDE 50 MG/1
50 TABLET ORAL EVERY 6 HOURS
Qty: 15 TABLET | Refills: 0 | Status: SHIPPED | OUTPATIENT
Start: 2024-01-22

## 2024-01-22 NOTE — PROGRESS NOTES
Post-operative Note    HPI:  The patient is status post left axillary hidradenitis excision.  Doing well overall.  Doing wet-to-dry to the wound.  Healing pretty well just some mild soreness in the wound.  No purulence, redness, fevers, chills.    PHYSICAL EXAM:    Wound area overall healing well proximally 10 x 8 cm area good granulation tissue.  Sutures removed.    No results found for this or any previous visit (from the past 504 hour(s)).     ASSESSMENT:      The patient is doing well after surgery.       PLAN:      Follow up in one month.    Patient come back in a month for reexamination.  Low-dose pain medication prescribed for few more days.  Continue wet to dries.  All questions were answered.

## 2024-03-04 ENCOUNTER — HOSPITAL ENCOUNTER (EMERGENCY)
Facility: HOSPITAL | Age: 34
Discharge: HOME | End: 2024-03-04

## 2024-03-04 VITALS
HEART RATE: 86 BPM | WEIGHT: 315 LBS | SYSTOLIC BLOOD PRESSURE: 126 MMHG | TEMPERATURE: 98 F | RESPIRATION RATE: 18 BRPM | DIASTOLIC BLOOD PRESSURE: 78 MMHG | OXYGEN SATURATION: 97 % | BODY MASS INDEX: 51.53 KG/M2

## 2024-03-04 DIAGNOSIS — R42 VERTIGO: Primary | ICD-10-CM

## 2024-03-04 DIAGNOSIS — R42 DIZZINESS: ICD-10-CM

## 2024-03-04 LAB
ANION GAP SERPL CALCULATED.3IONS-SCNC: 13 MMOL/L (ref 7–16)
BASOPHILS # BLD AUTO: 0.04 K/UL (ref 0–0.2)
BASOPHILS NFR BLD AUTO: 0.4 % (ref 0–1)
BILIRUB UR QL STRIP: NEGATIVE
BUN SERPL-MCNC: 13 MG/DL (ref 7–18)
BUN/CREAT SERPL: 12 (ref 6–20)
CALCIUM SERPL-MCNC: 9.2 MG/DL (ref 8.5–10.1)
CHLORIDE SERPL-SCNC: 99 MMOL/L (ref 98–107)
CLARITY UR: CLEAR
CO2 SERPL-SCNC: 27 MMOL/L (ref 21–32)
COLOR UR: ABNORMAL
CREAT SERPL-MCNC: 1.09 MG/DL (ref 0.7–1.3)
DIFFERENTIAL METHOD BLD: ABNORMAL
EGFR (NO RACE VARIABLE) (RUSH/TITUS): 91 ML/MIN/1.73M2
EOSINOPHIL # BLD AUTO: 0.22 K/UL (ref 0–0.5)
EOSINOPHIL NFR BLD AUTO: 2 % (ref 1–4)
ERYTHROCYTE [DISTWIDTH] IN BLOOD BY AUTOMATED COUNT: 12.1 % (ref 11.5–14.5)
GLUCOSE SERPL-MCNC: 82 MG/DL (ref 74–106)
GLUCOSE UR STRIP-MCNC: NORMAL MG/DL
HCT VFR BLD AUTO: 40.7 % (ref 40–54)
HGB BLD-MCNC: 12.5 G/DL (ref 13.5–18)
IMM GRANULOCYTES # BLD AUTO: 0.05 K/UL (ref 0–0.04)
IMM GRANULOCYTES NFR BLD: 0.5 % (ref 0–0.4)
KETONES UR STRIP-SCNC: NEGATIVE MG/DL
LEUKOCYTE ESTERASE UR QL STRIP: NEGATIVE
LYMPHOCYTES # BLD AUTO: 2.45 K/UL (ref 1–4.8)
LYMPHOCYTES NFR BLD AUTO: 22.1 % (ref 27–41)
MCH RBC QN AUTO: 28.8 PG (ref 27–31)
MCHC RBC AUTO-ENTMCNC: 30.7 G/DL (ref 32–36)
MCV RBC AUTO: 93.8 FL (ref 80–96)
MONOCYTES # BLD AUTO: 0.85 K/UL (ref 0–0.8)
MONOCYTES NFR BLD AUTO: 7.7 % (ref 2–6)
MPC BLD CALC-MCNC: 10.2 FL (ref 9.4–12.4)
MUCOUS, UA: ABNORMAL /LPF
NEUTROPHILS # BLD AUTO: 7.47 K/UL (ref 1.8–7.7)
NEUTROPHILS NFR BLD AUTO: 67.3 % (ref 53–65)
NITRITE UR QL STRIP: NEGATIVE
NRBC # BLD AUTO: 0 X10E3/UL
NRBC, AUTO (.00): 0 %
PH UR STRIP: 6 PH UNITS
PLATELET # BLD AUTO: 352 K/UL (ref 150–400)
POTASSIUM SERPL-SCNC: 3.6 MMOL/L (ref 3.5–5.1)
PROT UR QL STRIP: NEGATIVE
RBC # BLD AUTO: 4.34 M/UL (ref 4.6–6.2)
RBC # UR STRIP: ABNORMAL /UL
RBC #/AREA URNS HPF: 6 /HPF
SODIUM SERPL-SCNC: 135 MMOL/L (ref 136–145)
SP GR UR STRIP: 1.01
UROBILINOGEN UR STRIP-ACNC: NORMAL MG/DL
WBC # BLD AUTO: 11.08 K/UL (ref 4.5–11)
WBC #/AREA URNS HPF: 1 /HPF

## 2024-03-04 PROCEDURE — 99285 EMERGENCY DEPT VISIT HI MDM: CPT | Mod: 25

## 2024-03-04 PROCEDURE — 80048 BASIC METABOLIC PNL TOTAL CA: CPT | Performed by: NURSE PRACTITIONER

## 2024-03-04 PROCEDURE — 85025 COMPLETE CBC W/AUTO DIFF WBC: CPT | Performed by: NURSE PRACTITIONER

## 2024-03-04 PROCEDURE — 93010 ELECTROCARDIOGRAM REPORT: CPT | Mod: ,,, | Performed by: INTERNAL MEDICINE

## 2024-03-04 PROCEDURE — 99284 EMERGENCY DEPT VISIT MOD MDM: CPT | Mod: ,,, | Performed by: NURSE PRACTITIONER

## 2024-03-04 PROCEDURE — 93005 ELECTROCARDIOGRAM TRACING: CPT

## 2024-03-04 PROCEDURE — 81003 URINALYSIS AUTO W/O SCOPE: CPT | Performed by: NURSE PRACTITIONER

## 2024-03-04 RX ORDER — MECLIZINE HYDROCHLORIDE 25 MG/1
25 TABLET ORAL 3 TIMES DAILY PRN
Qty: 20 TABLET | Refills: 0 | Status: SHIPPED | OUTPATIENT
Start: 2024-03-04

## 2024-03-04 NOTE — Clinical Note
"Clayton "Krishnanoble Capone was seen and treated in our emergency department on 3/4/2024.  He may return to work on 03/06/2024.       If you have any questions or concerns, please don't hesitate to call.       RN    "

## 2024-03-05 NOTE — DISCHARGE INSTRUCTIONS
Use prescriptions as directed. Ensure you are drinking plenty of fluids, especially water. Follow a heart healthy, low fat diet. Get plenty of exercise. Keep a check on your blood pressure and keep a log to take with your to your next follow up appointment. Call and make an appointment with your primary care provider for recheck and continued care and management. Return to the ED for worsening signs and symptoms or otherwise as needed.

## 2024-03-05 NOTE — ED PROVIDER NOTES
Encounter Date: 3/4/2024       History     Chief Complaint   Patient presents with    Weakness     Pov to er - obese - c/o weakness all day - hx of L arm pit sx - wife states healing well    Hypertension     States been hi all day - its always high     32 y/o AAM presents to the emergency department with c/o dizziness. He reports he first started feeling dizzy about two weeks ago. He states that he has felt dizzy when he wakes up, while he is driving and while he is at work. He has tried to rest but it does not help. He denies headache, blurred vision, chest pain, shortness of breath or palpitations. He denies fever or chills. He is unable to further characterize his dizziness but states just does not feel right. He is wondering if he may have a UTI because he has had increased frequency and nocturia. He states he is not circumcised and sometimes does get a UTI. He reports some nausea but no vomiting. He reports having normal BM, last being today. He denies melena or hematochezia. He states that his blood pressure has been elevated a couple of times when he has checked it but it is a smaller cuff. He has had nothing for his symptoms. He denies nicotine or alcohol use; he does smoke marijuana daily. There are no particular exacerbating or remitting factors.     The history is provided by the patient.     Review of patient's allergies indicates:  No Known Allergies  Past Medical History:   Diagnosis Date    Cellulitis     Cyst on buttocks    Edema     Herpes simplex viral infection     Hidradenitis suppurativa     High risk sexual behavior     Hypertension     Parageusia     Suppurative hidradenitis      Past Surgical History:   Procedure Laterality Date    EXCISION OF HIDRADENITIS Left 12/8/2023    Procedure: EXCISION, HIDRADENITIS;  Surgeon: Onesimo Guerrero MD;  Location: [facility];  Service: General;  Laterality: Left;  partially closed    TONSILLECTOMY       Family History   Problem Relation Age of Onset     Cancer Other     Diabetes Other     Hypertension Other      Social History     Tobacco Use    Smoking status: Every Day     Current packs/day: 0.50     Average packs/day: 0.5 packs/day for 5.0 years (2.5 ttl pk-yrs)     Types: Cigarettes, Vaping w/o nicotine    Smokeless tobacco: Never   Substance Use Topics    Alcohol use: Not Currently     Comment: occ spirits    Drug use: Yes     Types: Marijuana     Comment: 3/4xs daily     Review of Systems   All other systems reviewed and are negative.      Physical Exam     Initial Vitals [03/04/24 2041]   BP Pulse Resp Temp SpO2   (!) 151/98 87 18 98.3 °F (36.8 °C) 96 %      MAP       --         Physical Exam    Constitutional: Vital signs are normal. He appears well-developed and well-nourished. He is Obese . He is active and cooperative.   HENT:   Right Ear: Tympanic membrane normal.   Left Ear: Tympanic membrane normal.   Nose: Nose normal.   Mouth/Throat: Oropharynx is clear and moist and mucous membranes are normal.   Cardiovascular:  Normal rate, regular rhythm, normal heart sounds and normal pulses.           Pulmonary/Chest: Effort normal and breath sounds normal.   Abdominal: Abdomen is soft. Bowel sounds are normal. There is no abdominal tenderness.     Neurological: He is alert and oriented to person, place, and time. He has normal strength. GCS eye subscore is 4. GCS verbal subscore is 5. GCS motor subscore is 6.   Skin: Skin is warm, dry and intact. Capillary refill takes less than 2 seconds.   Psychiatric: He has a normal mood and affect. His speech is normal and behavior is normal. Judgment and thought content normal. Cognition and memory are normal.         Medical Screening Exam   See Full Note    ED Course   Procedures  Labs Reviewed   URINALYSIS, REFLEX TO URINE CULTURE - Abnormal; Notable for the following components:       Result Value    Blood, UA Small (*)     All other components within normal limits   BASIC METABOLIC PANEL - Abnormal; Notable for the  following components:    Sodium 135 (*)     All other components within normal limits   CBC WITH DIFFERENTIAL - Abnormal; Notable for the following components:    WBC 11.08 (*)     RBC 4.34 (*)     Hemoglobin 12.5 (*)     MCHC 30.7 (*)     Neutrophils % 67.3 (*)     Lymphocytes % 22.1 (*)     Monocytes % 7.7 (*)     Immature Granulocytes % 0.5 (*)     Monocytes, Absolute 0.85 (*)     Immature Granulocytes, Absolute 0.05 (*)     All other components within normal limits   URINALYSIS, MICROSCOPIC - Abnormal; Notable for the following components:    RBC, UA 6 (*)     Mucous Occasional (*)     All other components within normal limits   CBC W/ AUTO DIFFERENTIAL    Narrative:     The following orders were created for panel order CBC auto differential.  Procedure                               Abnormality         Status                     ---------                               -----------         ------                     CBC with Differential[[id number]]       Abnormal            Final result                 Please view results for these tests on the individual orders.     EKG Readings: (Independently Interpreted)   Initial Reading: No STEMI. Rhythm: Normal Sinus Rhythm. Heart Rate: 70.   Reviewed by ED Attending       Imaging Results              CT Head Without Contrast (In process)                      Medications - No data to display  Medical Decision Making  32 y/o CHRIS presents to the emergency department with c/o dizziness. He reports he first started feeling dizzy about two weeks ago. He states that he has felt dizzy when he wakes up, while he is driving and while he is at work. He has tried to rest but it does not help. He denies headache, blurred vision, chest pain, shortness of breath or palpitations. He denies fever or chills. He is unable to further characterize his dizziness but states just does not feel right. He is wondering if he may have a UTI because he has had increased frequency and nocturia. He  states he is not circumcised and sometimes does get a UTI. He reports some nausea but no vomiting. He reports having normal BM, last being today. He denies melena or hematochezia. He states that his blood pressure has been elevated a couple of times when he has checked it but it is a smaller cuff. He has had nothing for his symptoms. He denies nicotine or alcohol use; he does smoke marijuana daily. There are no particular exacerbating or remitting factors.     Problems Addressed:  Dizziness:     Details: CT head negative for acute abnormalities. EKG unrevealed, reviewed by ED Attending as well; he has had no c/o chest pain, SOB or palpitations. Urinalysis negative. WBC count mildly elevated at 11K but patient is afebrile and otherwise without obvious signs of infection. Glucose was 82 on BMP, consistent with what it has previously been. Blood pressure did normalize with the use of a more appropriate sized cuff.     Amount and/or Complexity of Data Reviewed  Labs: ordered.  Radiology: ordered.    Risk  OTC drugs.  Prescription drug management.                                      Clinical Impression:   Final diagnoses:  [R42] Dizziness  [R42] Vertigo (Primary)        ED Disposition Condition    Discharge Stable          ED Prescriptions       Medication Sig Dispense Start Date End Date Auth. Provider    meclizine (ANTIVERT) 25 mg tablet Take 1 tablet (25 mg total) by mouth 3 (three) times daily as needed. 20 tablet 3/4/2024 -- Nicole Lewis FNP          Follow-up Information       Follow up With Specialties Details Why Contact Info    Primary Care Provider  Schedule an appointment as soon as possible for a visit                Nicole Lewis FNP  03/04/24 3853

## 2024-03-06 ENCOUNTER — TELEPHONE (OUTPATIENT)
Dept: EMERGENCY MEDICINE | Facility: HOSPITAL | Age: 34
End: 2024-03-06

## 2024-03-06 LAB
OHS QRS DURATION: 102 MS
OHS QTC CALCULATION: 419 MS

## 2025-07-24 ENCOUNTER — HOSPITAL ENCOUNTER (EMERGENCY)
Facility: HOSPITAL | Age: 35
Discharge: HOME | End: 2025-07-24

## 2025-07-24 VITALS
TEMPERATURE: 98 F | BODY MASS INDEX: 47.74 KG/M2 | HEART RATE: 91 BPM | RESPIRATION RATE: 20 BRPM | OXYGEN SATURATION: 95 % | WEIGHT: 315 LBS | DIASTOLIC BLOOD PRESSURE: 97 MMHG | HEIGHT: 68 IN | SYSTOLIC BLOOD PRESSURE: 172 MMHG

## 2025-07-24 DIAGNOSIS — M71.9 BURSITIS: ICD-10-CM

## 2025-07-24 DIAGNOSIS — M70.21 OLECRANON BURSITIS OF RIGHT ELBOW: Primary | ICD-10-CM

## 2025-07-24 PROCEDURE — 63600175 PHARM REV CODE 636 W HCPCS: Mod: JZ,TB

## 2025-07-24 PROCEDURE — 99284 EMERGENCY DEPT VISIT MOD MDM: CPT | Mod: 25

## 2025-07-24 PROCEDURE — 96372 THER/PROPH/DIAG INJ SC/IM: CPT

## 2025-07-24 RX ORDER — DEXAMETHASONE SODIUM PHOSPHATE 4 MG/ML
4 INJECTION, SOLUTION INTRA-ARTICULAR; INTRALESIONAL; INTRAMUSCULAR; INTRAVENOUS; SOFT TISSUE
Status: COMPLETED | OUTPATIENT
Start: 2025-07-24 | End: 2025-07-24

## 2025-07-24 RX ORDER — IBUPROFEN 800 MG/1
800 TABLET, FILM COATED ORAL 3 TIMES DAILY
Qty: 30 TABLET | Refills: 0 | Status: SHIPPED | OUTPATIENT
Start: 2025-07-24 | End: 2025-08-03

## 2025-07-24 RX ORDER — KETOROLAC TROMETHAMINE 30 MG/ML
30 INJECTION, SOLUTION INTRAMUSCULAR; INTRAVENOUS
Status: COMPLETED | OUTPATIENT
Start: 2025-07-24 | End: 2025-07-24

## 2025-07-24 RX ORDER — CLINDAMYCIN HYDROCHLORIDE 300 MG/1
300 CAPSULE ORAL EVERY 6 HOURS
Qty: 40 CAPSULE | Refills: 0 | Status: SHIPPED | OUTPATIENT
Start: 2025-07-24 | End: 2025-08-03

## 2025-07-24 RX ADMIN — KETOROLAC TROMETHAMINE 30 MG: 30 INJECTION, SOLUTION INTRAMUSCULAR at 07:07

## 2025-07-24 RX ADMIN — DEXAMETHASONE SODIUM PHOSPHATE 4 MG: 4 INJECTION, SOLUTION INTRA-ARTICULAR; INTRALESIONAL; INTRAMUSCULAR; INTRAVENOUS; SOFT TISSUE at 07:07

## 2025-07-24 NOTE — Clinical Note
"Clayton "Frankie Capone was seen and treated in our emergency department on 7/24/2025.  He may return to work on 07/26/2025.       If you have any questions or concerns, please don't hesitate to call.      Kike Gannon, NP"

## 2025-07-25 NOTE — DISCHARGE INSTRUCTIONS
Take medication as ordered.  Take clindamycin with food and drink plenty of water.  Follow up with primary care provider in [place] for an appointment for re-evaluation.  Return to the emergency department for new or worsening symptoms.

## 2025-07-25 NOTE — ED PROVIDER NOTES
Encounter Date: 7/24/2025       History     Chief Complaint   Patient presents with    Joint Swelling     Patient presents to ED with c/o right elbow swelling and pain that started a couple weeks ago.      34-year old male presents to the emergency department for evaluation of right elbow pain and swelling that began several days ago. Denies injury to right elbow, however reports that he has a history of hidradenitis suppurativa and has occasional issues with swelling. Denies fever, chills, numbness/tingling/loss of muscle control to right upper extremity.    The history is provided by the patient. No  was used.   Arm Injury   The incident occurred several days ago. The incident occurred at home. The injury mechanism is unknown. He came to the ER via by private vehicle. There is an injury to the Right elbow. There have been no prior injuries to these areas. His tetanus status is UTD. He has been Behaving normally. He has received no recent medical care. Pertinent negatives include no numbness, no headaches, no inability to bear weight, no pain when bearing weight and no weakness.     Review of patient's allergies indicates:  No Known Allergies  Past Medical History:   Diagnosis Date    Cellulitis     Cyst on buttocks    Edema     Herpes simplex viral infection     Hidradenitis suppurativa     High risk sexual behavior     Hypertension     Parageusia     Suppurative hidradenitis      Past Surgical History:   Procedure Laterality Date    EXCISION OF HIDRADENITIS Left 12/8/2023    Procedure: EXCISION, HIDRADENITIS;  Surgeon: Onesimo Guerrero MD;  Location: [facility];  Service: General;  Laterality: Left;  partially closed    TONSILLECTOMY       Family History   Problem Relation Name Age of Onset    Cancer Other      Diabetes Other      Hypertension Other       Social History[1]  Review of Systems   Constitutional:  Negative for chills and fever.   Respiratory:  Negative for wheezing.     Musculoskeletal:  Negative for arthralgias, back pain and myalgias.   Neurological:  Negative for dizziness, tremors, weakness, numbness and headaches.   Psychiatric/Behavioral:  Negative for confusion.    All other systems reviewed and are negative.      Physical Exam     Initial Vitals [07/24/25 1832]   BP Pulse Resp Temp SpO2   (!) 172/97 91 20 98 °F (36.7 °C) 95 %      MAP       --         Physical Exam    Vitals reviewed.  Constitutional: He appears well-nourished. No distress.   HENT:   Head: Normocephalic.   Eyes: Conjunctivae are normal. Right eye exhibits no discharge. Left eye exhibits no discharge.   Neck: Neck supple.   Normal range of motion.  Cardiovascular:  Normal rate, regular rhythm and normal heart sounds.           Pulses:       Radial pulses are 2+ on the right side.   Pulmonary/Chest: Breath sounds normal. No respiratory distress. He has no wheezes. He has no rhonchi. He has no rales. He exhibits no tenderness.   Abdominal: Abdomen is soft. Bowel sounds are normal. He exhibits no distension and no mass. There is no abdominal tenderness. There is no rebound and no guarding.   Musculoskeletal:      Right elbow: Swelling present. No deformity, effusion or lacerations. Normal range of motion. Tenderness present.      Cervical back: Normal range of motion and neck supple.     Lymphadenopathy:     He has no cervical adenopathy.   Neurological: He is alert and oriented to person, place, and time. He has normal strength. GCS score is 15. GCS eye subscore is 4. GCS verbal subscore is 5. GCS motor subscore is 6.   Skin: Skin is warm and dry. Capillary refill takes less than 2 seconds.   Psychiatric: He has a normal mood and affect. His behavior is normal.         Medical Screening Exam   See Full Note    ED Course   Procedures  Labs Reviewed - No data to display       Imaging Results              X-Ray Elbow 2 Views Right (In process)    Procedure changed from X-Ray Elbow Complete Right                     Medications   ketorolac injection 30 mg (30 mg Intramuscular Given 7/24/25 1947)   dexAMETHasone injection 4 mg (4 mg Intramuscular Given 7/24/25 1947)     Medical Decision Making  34-year old male presents to the emergency department for evaluation of right elbow pain and swelling that began several days ago. Denies injury to right elbow, however reports that he has a history of hidradenitis suppurativa and has occasional issues with swelling. Denies fever, chills, numbness/tingling/loss of muscle control to right upper extremity.  Right elbow X-ray ordered  Toradol, Decadron IM administered in ED   Follow up and return precautions with patient, prescriptions provided   Diagnosis:  Olecranon bursitis of right elbow      Amount and/or Complexity of Data Reviewed  Radiology: ordered.    Risk  Prescription drug management.                                      Clinical Impression:   Final diagnoses:  [M71.9] Bursitis  [M70.21] Olecranon bursitis of right elbow (Primary)        ED Disposition Condition    Discharge Stable          ED Prescriptions       Medication Sig Dispense Start Date End Date Auth. Provider    ibuprofen (ADVIL,MOTRIN) 800 MG tablet Take 1 tablet (800 mg total) by mouth 3 (three) times daily. for 10 days 30 tablet 7/24/2025 8/3/2025 Kike Gannon NP    clindamycin (CLEOCIN) 300 MG capsule Take 1 capsule (300 mg total) by mouth every 6 (six) hours. for 10 days 40 capsule 7/24/2025 8/3/2025 Kike Gannon NP          Follow-up Information    None              [1]   Social History  Tobacco Use    Smoking status: Every Day     Current packs/day: 0.50     Average packs/day: 0.5 packs/day for 5.0 years (2.5 ttl pk-yrs)     Types: Cigarettes, Vaping w/o nicotine    Smokeless tobacco: Never   Substance Use Topics    Alcohol use: Not Currently     Comment: occ spirits    Drug use: Yes     Types: Marijuana     Comment: 3/4xs daily        Kike Gannon NP  07/24/25 3658

## (undated) DEVICE — GOWN POLY REINF BRTH SLV XL

## (undated) DEVICE — BANDAGE GAUZE COT STRL 4.5X4.1

## (undated) DEVICE — GLOVE 6.0 PROTEXIS PI BLUE

## (undated) DEVICE — SUT JJ41G O VICRYL

## (undated) DEVICE — SUT ETHILON 2LR DA 30IN BLK

## (undated) DEVICE — GLOVE PROTEXIS PI SYN SURG 6.0

## (undated) DEVICE — GLOVE PROTEXIS PI SYN SURG 6.5

## (undated) DEVICE — KIT BASIC RUSH

## (undated) DEVICE — GLOVE PROTEXIS PI SYN SURG 7

## (undated) DEVICE — PAD ABDOMINAL 8X7.5 STERILE

## (undated) DEVICE — GLOVE 6.5 PROTEXIS PI BLUE

## (undated) DEVICE — GLOVE PROTEXIS PI SYN SURG 7.5